# Patient Record
Sex: FEMALE | Race: BLACK OR AFRICAN AMERICAN | NOT HISPANIC OR LATINO | URBAN - METROPOLITAN AREA
[De-identification: names, ages, dates, MRNs, and addresses within clinical notes are randomized per-mention and may not be internally consistent; named-entity substitution may affect disease eponyms.]

---

## 2017-11-15 PROBLEM — Z00.00 ENCOUNTER FOR PREVENTIVE HEALTH EXAMINATION: Status: ACTIVE | Noted: 2017-11-15

## 2019-09-14 ENCOUNTER — EMERGENCY (EMERGENCY)
Facility: HOSPITAL | Age: 34
LOS: 1 days | Discharge: LEFT BEFORE DISCHARGE | End: 2019-09-14
Attending: EMERGENCY MEDICINE
Payer: COMMERCIAL

## 2019-09-14 VITALS
TEMPERATURE: 98 F | OXYGEN SATURATION: 100 % | RESPIRATION RATE: 18 BRPM | DIASTOLIC BLOOD PRESSURE: 97 MMHG | HEIGHT: 68 IN | WEIGHT: 214.95 LBS | HEART RATE: 121 BPM | SYSTOLIC BLOOD PRESSURE: 151 MMHG

## 2019-09-14 PROCEDURE — 99284 EMERGENCY DEPT VISIT MOD MDM: CPT | Mod: 25

## 2019-09-14 PROCEDURE — 93010 ELECTROCARDIOGRAM REPORT: CPT

## 2019-09-14 NOTE — ED ADULT NURSE NOTE - NSIMPLEMENTINTERV_GEN_ALL_ED
Implemented All Universal Safety Interventions:  [place] to call system. Call bell, personal items and telephone within reach. Instruct patient to call for assistance. Room bathroom lighting operational. Non-slip footwear when patient is off stretcher. Physically safe environment: no spills, clutter or unnecessary equipment. Stretcher in lowest position, wheels locked, appropriate side rails in place.

## 2019-09-14 NOTE — ED ADULT NURSE NOTE - OBJECTIVE STATEMENT
35 yo female c/o weakness, lightheadness/dizziness, nausea, and epsiodes of feeling cool and clammy. hx lupus/rheumatoid arthritis. pt denies vomiting, diarrhea. afebrile orally. pt tachycardic 120's, sinus tach, BP stable. pt denies chx pn /palpitations. pt denies SOB, l/s equal clr bilat apices to bases. abd soft nontender. pt A&OX4, PERRL. labs pending, MD bedside, will continue to monitor.

## 2019-09-15 VITALS
TEMPERATURE: 100 F | RESPIRATION RATE: 18 BRPM | SYSTOLIC BLOOD PRESSURE: 148 MMHG | HEART RATE: 114 BPM | OXYGEN SATURATION: 100 % | DIASTOLIC BLOOD PRESSURE: 92 MMHG

## 2019-09-15 LAB
ALBUMIN SERPL ELPH-MCNC: 3.5 G/DL — SIGNIFICANT CHANGE UP (ref 3.3–5)
ALP SERPL-CCNC: 81 U/L — SIGNIFICANT CHANGE UP (ref 40–120)
ALT FLD-CCNC: 11 U/L — SIGNIFICANT CHANGE UP (ref 10–45)
ANION GAP SERPL CALC-SCNC: 15 MMOL/L — SIGNIFICANT CHANGE UP (ref 5–17)
APPEARANCE UR: CLEAR — SIGNIFICANT CHANGE UP
AST SERPL-CCNC: 13 U/L — SIGNIFICANT CHANGE UP (ref 10–40)
BACTERIA # UR AUTO: NEGATIVE — SIGNIFICANT CHANGE UP
BASE EXCESS BLDV CALC-SCNC: 2.2 MMOL/L — HIGH (ref -2–2)
BASOPHILS # BLD AUTO: 0 K/UL — SIGNIFICANT CHANGE UP (ref 0–0.2)
BASOPHILS NFR BLD AUTO: 0.1 % — SIGNIFICANT CHANGE UP (ref 0–2)
BILIRUB SERPL-MCNC: 0.1 MG/DL — LOW (ref 0.2–1.2)
BILIRUB UR-MCNC: NEGATIVE — SIGNIFICANT CHANGE UP
BLD GP AB SCN SERPL QL: NEGATIVE — SIGNIFICANT CHANGE UP
BUN SERPL-MCNC: 14 MG/DL — SIGNIFICANT CHANGE UP (ref 7–23)
CA-I SERPL-SCNC: 1.18 MMOL/L — SIGNIFICANT CHANGE UP (ref 1.12–1.3)
CALCIUM SERPL-MCNC: 8.9 MG/DL — SIGNIFICANT CHANGE UP (ref 8.4–10.5)
CHLORIDE BLDV-SCNC: 104 MMOL/L — SIGNIFICANT CHANGE UP (ref 96–108)
CHLORIDE SERPL-SCNC: 103 MMOL/L — SIGNIFICANT CHANGE UP (ref 96–108)
CO2 BLDV-SCNC: 28 MMOL/L — SIGNIFICANT CHANGE UP (ref 22–30)
CO2 SERPL-SCNC: 22 MMOL/L — SIGNIFICANT CHANGE UP (ref 22–31)
COLOR SPEC: COLORLESS — SIGNIFICANT CHANGE UP
CREAT SERPL-MCNC: 0.43 MG/DL — LOW (ref 0.5–1.3)
DIFF PNL FLD: NEGATIVE — SIGNIFICANT CHANGE UP
EOSINOPHIL # BLD AUTO: 0 K/UL — SIGNIFICANT CHANGE UP (ref 0–0.5)
EOSINOPHIL NFR BLD AUTO: 0.2 % — SIGNIFICANT CHANGE UP (ref 0–6)
EPI CELLS # UR: 1 /HPF — SIGNIFICANT CHANGE UP
GAS PNL BLDV: 139 MMOL/L — SIGNIFICANT CHANGE UP (ref 135–145)
GAS PNL BLDV: SIGNIFICANT CHANGE UP
GAS PNL BLDV: SIGNIFICANT CHANGE UP
GLUCOSE BLDV-MCNC: 94 MG/DL — SIGNIFICANT CHANGE UP (ref 70–99)
GLUCOSE SERPL-MCNC: 94 MG/DL — SIGNIFICANT CHANGE UP (ref 70–99)
GLUCOSE UR QL: NEGATIVE — SIGNIFICANT CHANGE UP
HCO3 BLDV-SCNC: 26 MMOL/L — SIGNIFICANT CHANGE UP (ref 21–29)
HCT VFR BLD CALC: 27.4 % — LOW (ref 34.5–45)
HCT VFR BLDA CALC: 26 % — LOW (ref 39–50)
HGB BLD CALC-MCNC: 8.4 G/DL — LOW (ref 11.5–15.5)
HGB BLD-MCNC: 8.6 G/DL — LOW (ref 11.5–15.5)
HYALINE CASTS # UR AUTO: 0 /LPF — SIGNIFICANT CHANGE UP (ref 0–2)
KETONES UR-MCNC: NEGATIVE — SIGNIFICANT CHANGE UP
LACTATE BLDV-MCNC: 1.1 MMOL/L — SIGNIFICANT CHANGE UP (ref 0.7–2)
LEUKOCYTE ESTERASE UR-ACNC: NEGATIVE — SIGNIFICANT CHANGE UP
LYMPHOCYTES # BLD AUTO: 1.4 K/UL — SIGNIFICANT CHANGE UP (ref 1–3.3)
LYMPHOCYTES # BLD AUTO: 23.7 % — SIGNIFICANT CHANGE UP (ref 13–44)
MCHC RBC-ENTMCNC: 24.1 PG — LOW (ref 27–34)
MCHC RBC-ENTMCNC: 31.5 GM/DL — LOW (ref 32–36)
MCV RBC AUTO: 76.6 FL — LOW (ref 80–100)
MONOCYTES # BLD AUTO: 0.6 K/UL — SIGNIFICANT CHANGE UP (ref 0–0.9)
MONOCYTES NFR BLD AUTO: 10.9 % — SIGNIFICANT CHANGE UP (ref 2–14)
NEUTROPHILS # BLD AUTO: 3.7 K/UL — SIGNIFICANT CHANGE UP (ref 1.8–7.4)
NEUTROPHILS NFR BLD AUTO: 65.1 % — SIGNIFICANT CHANGE UP (ref 43–77)
NITRITE UR-MCNC: NEGATIVE — SIGNIFICANT CHANGE UP
OB PNL STL: NEGATIVE — SIGNIFICANT CHANGE UP
OTHER CELLS CSF MANUAL: 6 ML/DL — LOW (ref 18–22)
PCO2 BLDV: 42 MMHG — SIGNIFICANT CHANGE UP (ref 35–50)
PH BLDV: 7.42 — SIGNIFICANT CHANGE UP (ref 7.35–7.45)
PH UR: 6.5 — SIGNIFICANT CHANGE UP (ref 5–8)
PLATELET # BLD AUTO: 445 K/UL — HIGH (ref 150–400)
PO2 BLDV: 28 MMHG — SIGNIFICANT CHANGE UP (ref 25–45)
POTASSIUM BLDV-SCNC: 3.2 MMOL/L — LOW (ref 3.5–5.3)
POTASSIUM SERPL-MCNC: 3.5 MMOL/L — SIGNIFICANT CHANGE UP (ref 3.5–5.3)
POTASSIUM SERPL-SCNC: 3.5 MMOL/L — SIGNIFICANT CHANGE UP (ref 3.5–5.3)
PROT SERPL-MCNC: 7.6 G/DL — SIGNIFICANT CHANGE UP (ref 6–8.3)
PROT UR-MCNC: NEGATIVE — SIGNIFICANT CHANGE UP
RBC # BLD: 3.58 M/UL — LOW (ref 3.8–5.2)
RBC # FLD: 14.5 % — SIGNIFICANT CHANGE UP (ref 10.3–14.5)
RBC CASTS # UR COMP ASSIST: 1 /HPF — SIGNIFICANT CHANGE UP (ref 0–4)
RH IG SCN BLD-IMP: POSITIVE — SIGNIFICANT CHANGE UP
SAO2 % BLDV: 55 % — LOW (ref 67–88)
SODIUM SERPL-SCNC: 140 MMOL/L — SIGNIFICANT CHANGE UP (ref 135–145)
SP GR SPEC: 1.01 — LOW (ref 1.01–1.02)
UROBILINOGEN FLD QL: NEGATIVE — SIGNIFICANT CHANGE UP
WBC # BLD: 5.7 K/UL — SIGNIFICANT CHANGE UP (ref 3.8–10.5)
WBC # FLD AUTO: 5.7 K/UL — SIGNIFICANT CHANGE UP (ref 3.8–10.5)
WBC UR QL: 1 /HPF — SIGNIFICANT CHANGE UP (ref 0–5)

## 2019-09-15 PROCEDURE — 82435 ASSAY OF BLOOD CHLORIDE: CPT

## 2019-09-15 PROCEDURE — 93005 ELECTROCARDIOGRAM TRACING: CPT

## 2019-09-15 PROCEDURE — 80053 COMPREHEN METABOLIC PANEL: CPT

## 2019-09-15 PROCEDURE — 83605 ASSAY OF LACTIC ACID: CPT

## 2019-09-15 PROCEDURE — 84295 ASSAY OF SERUM SODIUM: CPT

## 2019-09-15 PROCEDURE — 85027 COMPLETE CBC AUTOMATED: CPT

## 2019-09-15 PROCEDURE — 96374 THER/PROPH/DIAG INJ IV PUSH: CPT | Mod: XU

## 2019-09-15 PROCEDURE — 99284 EMERGENCY DEPT VISIT MOD MDM: CPT | Mod: 25

## 2019-09-15 PROCEDURE — 82947 ASSAY GLUCOSE BLOOD QUANT: CPT

## 2019-09-15 PROCEDURE — 71275 CT ANGIOGRAPHY CHEST: CPT

## 2019-09-15 PROCEDURE — 81001 URINALYSIS AUTO W/SCOPE: CPT

## 2019-09-15 PROCEDURE — 85014 HEMATOCRIT: CPT

## 2019-09-15 PROCEDURE — 86850 RBC ANTIBODY SCREEN: CPT

## 2019-09-15 PROCEDURE — 86901 BLOOD TYPING SEROLOGIC RH(D): CPT

## 2019-09-15 PROCEDURE — 96361 HYDRATE IV INFUSION ADD-ON: CPT

## 2019-09-15 PROCEDURE — 82330 ASSAY OF CALCIUM: CPT

## 2019-09-15 PROCEDURE — 82272 OCCULT BLD FECES 1-3 TESTS: CPT

## 2019-09-15 PROCEDURE — 86900 BLOOD TYPING SEROLOGIC ABO: CPT

## 2019-09-15 PROCEDURE — 71045 X-RAY EXAM CHEST 1 VIEW: CPT

## 2019-09-15 PROCEDURE — 71275 CT ANGIOGRAPHY CHEST: CPT | Mod: 26

## 2019-09-15 PROCEDURE — 84132 ASSAY OF SERUM POTASSIUM: CPT

## 2019-09-15 PROCEDURE — 82803 BLOOD GASES ANY COMBINATION: CPT

## 2019-09-15 PROCEDURE — 71045 X-RAY EXAM CHEST 1 VIEW: CPT | Mod: 26

## 2019-09-15 RX ORDER — ACETAMINOPHEN 500 MG
975 TABLET ORAL ONCE
Refills: 0 | Status: COMPLETED | OUTPATIENT
Start: 2019-09-15 | End: 2019-09-15

## 2019-09-15 RX ORDER — MORPHINE SULFATE 50 MG/1
4 CAPSULE, EXTENDED RELEASE ORAL ONCE
Refills: 0 | Status: DISCONTINUED | OUTPATIENT
Start: 2019-09-15 | End: 2019-09-15

## 2019-09-15 RX ORDER — METOCLOPRAMIDE HCL 10 MG
10 TABLET ORAL ONCE
Refills: 0 | Status: COMPLETED | OUTPATIENT
Start: 2019-09-15 | End: 2019-09-15

## 2019-09-15 RX ORDER — SODIUM CHLORIDE 9 MG/ML
1000 INJECTION, SOLUTION INTRAVENOUS ONCE
Refills: 0 | Status: COMPLETED | OUTPATIENT
Start: 2019-09-15 | End: 2019-09-15

## 2019-09-15 RX ADMIN — Medication 975 MILLIGRAM(S): at 01:01

## 2019-09-15 RX ADMIN — Medication 10 MILLIGRAM(S): at 01:01

## 2019-09-15 RX ADMIN — SODIUM CHLORIDE 1000 MILLILITER(S): 9 INJECTION, SOLUTION INTRAVENOUS at 03:49

## 2019-09-15 RX ADMIN — Medication 975 MILLIGRAM(S): at 03:48

## 2019-09-15 RX ADMIN — SODIUM CHLORIDE 1000 MILLILITER(S): 9 INJECTION, SOLUTION INTRAVENOUS at 01:02

## 2019-09-15 NOTE — ED PROVIDER NOTE - NSFOLLOWUPINSTRUCTIONS_ED_ALL_ED_FT
You were seen in the ER for joint pain and lightheadedness.    1. Follow up with your primary care doctor within 48 hours.    2. Continue to take your home medications as prescribed by your primary care doctor.    3. Return to the ER for any new or worsening symptoms or concerns, such as fever, chills, worsening pain, lightheadedness, etc.

## 2019-09-15 NOTE — ED PROVIDER NOTE - CLINICAL SUMMARY MEDICAL DECISION MAKING FREE TEXT BOX
see attending attestation see attending attestation    Armando, PGY1 - 34F PMH SLE, RA on Rituxan and Plaquenil p/w lightheadedness, nausea, dry cough, SOB, chills x 9 days s/p Rituxan infusion on 9/3/19. Tachycardic in the ED. BL knee swelling. DDx includes SLE/RA flare up, infection. R/o PE. Will obtain labs, CXR, EKG. Symptomatically control, reassess.

## 2019-09-15 NOTE — ED PROVIDER NOTE - PROGRESS NOTE DETAILS
Armando, PGY1 - Pt endorses great improvement in lightheadedness, nausea, and elbow pain. Still endorsing mild knee pain. Armando, PGY1 - Spoke to radiology Dr. Reagan. Unable to obtain adequte CTA study 2/2 pt unable to tolerate hand IV. Pt persistently tachycadic. Will attempt USIV and repeat CTA. Armando, PGY1 - Spoke to radiology Dr. Reagan. Unable to obtain adequte CTA study 2/2 pt unable to tolerate hand IV. Pt persistently tachycardic. Will attempt USIV and repeat CTA. Armando, PGY1 - Discussed need for USIV and repeat CTA with pt. Pt declines USIV and CTA, and expresses that she would like to be discharged home against medical advice. Reports great improvement in symptoms and feels comfortable following up with her PMD tomorrow. I have assessed the patient's mental status and  the patient has capacity to make this decision. I have explained the risks of leaving without full treatment, including severe disability and death, which the patient understands and is willing to accept. I have answered all of the patient's questions. I reiterated my medical opinion and advised the patient to return at any time. We discussed the further workup outside of the current visit and return precautions.

## 2019-09-15 NOTE — ED ADULT NURSE REASSESSMENT NOTE - NS ED NURSE REASSESS COMMENT FT1
CTA unsuccessful, pt requesting to speak to MD about leaving AMA. Pt st medication resolved symptoms of nausea/dizziness. currently denies all complaints. pt still tachycardic.

## 2019-09-15 NOTE — ED PROVIDER NOTE - OBJECTIVE STATEMENT
34F PMH SLE, RA on Rituxan and Plaquenil p/w lightheadedness, nausea, dry cough, SOB, chills x 9 days s/p Rituxan infusion on 9/3/19. No change in her baseline CP. Pt receives Rituxan q6 months and has never had a reaction before. Presents to ED after noticing hard, black stool today. No fevers, sick contacts. 34F PMH SLE, RA on Rituxan and Plaquenil p/w lightheadedness, nausea, dry cough, SOB, chills, worsening joint swelling and pain x 9 days s/p Rituxan infusion on 9/3/19. No change in her baseline CP. Pt receives Rituxan q6 months and has never had a reaction before. Presents to ED after noticing hard, black stool today. No fevers, sick contacts.

## 2019-09-15 NOTE — ED PROVIDER NOTE - PATIENT PORTAL LINK FT
You can access the FollowMyHealth Patient Portal offered by [facility] by registering at the following website: [email or web address]. By joining [email or web address]’s FollowMyHealth portal, you will also be able to view your health information using other applications (apps) compatible with our system.

## 2019-09-15 NOTE — ED PROVIDER NOTE - NS ED ROS FT
General: +Chills, lightheadedness. Denies fever  Eyes: Denies vision changes  ENMT: Denies trouble swallowing or speaking, or sore throat  CV: +chest pain  Resp: +cough or SOB  GI: +nausea, black stool. Denies abdominal pain, vomiting, diarrhea, or constipation   : Denies painful urination  Skin: Denies new rashes  Neuro: Denies headache, numbness, or focal weakness  MSK: +chronic joint pain, +BL knee swelling. Denies recent trauma General: +Chills, lightheadedness. Denies fever  Eyes: Denies vision changes  ENMT: Denies trouble swallowing or speaking, or sore throat  CV: +Chest pain  Resp: +Cough, SOB  GI: +Nausea, black stool. Denies abdominal pain, vomiting, diarrhea, or constipation   : Denies painful urination  Skin: Denies new rashes  Neuro: Denies headache, numbness, or focal weakness  MSK: +Chronic joint pain, +BL knee swelling and pain. Denies recent trauma

## 2019-09-15 NOTE — ED PROVIDER NOTE - PHYSICAL EXAMINATION
I have reviewed the triage vital signs.  Const: AAOx3, laying supine in NAD  Eyes: PERRL, no conjunctival injection  HENT: NCAT, Neck supple without meningismus, oral mucosa moist  CV: RRR, no murmurs, rubs, or gallops, 2+ radial and DP pulses  Resp: CTAB, no respiratory distress, no wheezes, rales, or rhonchi  GI: Abdomen soft, NTND, no guarding, no CVA tenderness  Skin: Warm, well perfused, no rash  MSK: +BL knee swelling. TTP upper back (baseline). No gross deformities appreciated  Neuro: No focal sensory or motor deficits, CN 2-12 grossly intact  Psych: Appropriate mood and affect I have reviewed the triage vital signs.  Const: AAOx3, laying supine in NAD  Eyes: PERRL, no conjunctival injection  HENT: NCAT, Neck supple without meningismus, oral mucosa moist  CV: Tachycardic, regular rhythm, no murmurs, rubs, or gallops, 2+ radial and DP pulses  Resp: CTAB, no respiratory distress, no wheezes, rales, or rhonchi  GI: Abdomen soft, NTND, no guarding, no CVA tenderness  Skin: Warm, well perfused, no rash  MSK: +BL elbow and knee swelling. TTP upper back (baseline). No midline tenderness. No gross deformities appreciated  Neuro: No focal sensory or motor deficits, CN 2-12 grossly intact  Psych: Appropriate mood and affect, anxious

## 2019-09-15 NOTE — ED PROVIDER NOTE - CARE PLAN
Principal Discharge DX:	Joint pain  Secondary Diagnosis:	Tachycardia  Secondary Diagnosis:	Lightheadedness Principal Discharge DX:	Rheumatoid arthritis flare  Secondary Diagnosis:	Tachycardia  Secondary Diagnosis:	Lightheadedness  Secondary Diagnosis:	Joint pain  Secondary Diagnosis:	Lupus

## 2019-09-15 NOTE — ED PROVIDER NOTE - ATTENDING CONTRIBUTION TO CARE
33yo f hx Lupus, Rheumatoid arthritis on rituxin and plaquenil pw lightheadedness, nausea and chills since her last rituxin treatment on the 3rd. Patient has had rituxin before wo any reaction. Patient today had a hard stool that was dark in appearance abd became concerned. Pt also has a flare at this time w b/l knee swelling that is also new for her. No fever, recent travel.   Pt noted to be tachycardic. EKG reviewed.   On exam, Patient is awake,alert,oriented x 3. Patient is well appearing and in no acute distress. Patient's chest is clear to ausculation, +s1s2. Abdomen is soft nd/nt +BS. Extremity with no swelling or calf tenderness. B/L knee w swelling, Full ROM, nml sensation.  Rectal per resident.  Check Labs, EKG. Pt states she is in pain. DDx Lupus flare, ro PE, Treat with pain meds and re eval.

## 2022-12-12 PROBLEM — Z00.00 ENCOUNTER FOR PREVENTIVE HEALTH EXAMINATION: Status: ACTIVE | Noted: 2022-12-12

## 2022-12-22 ENCOUNTER — APPOINTMENT (OUTPATIENT)
Dept: ORTHOPEDIC SURGERY | Facility: CLINIC | Age: 37
End: 2022-12-22

## 2022-12-22 VITALS — HEIGHT: 67 IN | BODY MASS INDEX: 34.53 KG/M2 | WEIGHT: 220 LBS

## 2022-12-22 DIAGNOSIS — Z78.9 OTHER SPECIFIED HEALTH STATUS: ICD-10-CM

## 2022-12-22 DIAGNOSIS — M17.12 UNILATERAL PRIMARY OSTEOARTHRITIS, LEFT KNEE: ICD-10-CM

## 2022-12-22 PROCEDURE — 73564 X-RAY EXAM KNEE 4 OR MORE: CPT | Mod: LT

## 2022-12-22 PROCEDURE — 99204 OFFICE O/P NEW MOD 45 MIN: CPT

## 2022-12-22 NOTE — ASSESSMENT
[FreeTextEntry1] : 36 y/o F with severe L knee OA\par \par \par Osiel ROSE M.D. discussed the patient’s diagnosis and treatment options, non-surgical and surgical, with the patient and/or legal guardian including the potential benefits and complications of each. Potential complications of non-surgical treatments discussed include residual pain and/or disability, non-healing, progression of symptoms and/or disease. Potential complications of surgery discussed include infection, nerve injury, vascular injury, cartilage injury, ligament injury, tendon injury, muscle injury, skin injury, stiffness, instability, weakness, persistent pain, technical or hardware failure, need for additional surgery, re-injury, medical complication, anesthetic related complication, and/or death. All questions were answered. The patient and/or legal guardian has elected to proceed with surgery. Informed consent obtained for Left CAT TKA\par \par                   \par Preoperative evaluation and testing as needed is advised within 30 days prior to the procedure.\par

## 2022-12-22 NOTE — IMAGING
[de-identified] : Constitutional: well developed and well nourished, able to communicate\par Cardiovascular: Peripheral vascular exam is grossly normal\par Neurologic: Alert and oriented, no acute distress.\par Skin: normal skin with no ulcers, rashes, or lesions\par Pulmonary: No respiratory distress, breathing comfortably on room air\par Lymphatics: No obvious lymphadenopathy or lymphedema in areas examined\par \par Knee Exam\par Alignment: Valgus \par Effusion: None\par Atrophy: None                                                \par Stable to Varus/valgus stress\par Posterior Drawer Test: negative\par Anterior Drawer Test: Negative\par Knee Extension/Flexion: 0 / 120\par \par Medial/lateral compartments\par Medial joint line: POS Tenderness\par Lateral joint line: POS Tenderness\par Jennifer test: negative\par \par Patellofemoral joint\par Medial patellar facet: no tenderness\par Patellar grind: Negative\par \par Tendons:\par Pes Anserine: No tenderness\par Gerdy’s Tubercle/ IT Band: No tenderness\par Quadriceps Tendon: No Tenderness\par patellar tendon: no Tenderness\par Tibial tubercle: not tenderness\par Calf: no Tenderness\par \par Neurovascular exam\par Muscle strength: 5/5\par Sensation to light touch: intact\par Distal pulses: 2+\par \par IMAGING:\par 12/22/22: 4v of L knee showing valgus OA, bone on bone arthritis, lateral joint collapse, sclerosis and osteophytes noted

## 2022-12-22 NOTE — HISTORY OF PRESENT ILLNESS
[3] : 3 [0] : 0 [Sharp] : sharp [Throbbing] : throbbing [Intermittent] : intermittent [Household chores] : household chores [Leisure] : leisure [Work] : work [Nothing helps with pain getting better] : Nothing helps with pain getting better [de-identified] : 12/22/22: 36 y/o F with PMHx of RA. Has known OA to L knee. Has had CSI and gel injections. States the last gel was in June, no relieve but was done with rheumatology. States the first injection had great relieve. States CSI no longer giving her relieve. \par \par PMH: RA (no biologic agents), no hx of blood clots, no bloodthinners [] : Post Surgical Visit: no [FreeTextEntry1] : left knee [FreeTextEntry5] : pt feels pain and pressure in the knee [FreeTextEntry7] : left hip [de-identified] : motion/pressure

## 2022-12-27 ENCOUNTER — FORM ENCOUNTER (OUTPATIENT)
Age: 37
End: 2022-12-27

## 2022-12-28 ENCOUNTER — APPOINTMENT (OUTPATIENT)
Dept: CT IMAGING | Facility: CLINIC | Age: 37
End: 2022-12-28

## 2022-12-28 PROCEDURE — 76376 3D RENDER W/INTRP POSTPROCES: CPT | Mod: LT

## 2022-12-28 PROCEDURE — 73700 CT LOWER EXTREMITY W/O DYE: CPT | Mod: LT

## 2023-01-10 ENCOUNTER — OUTPATIENT (OUTPATIENT)
Dept: OUTPATIENT SERVICES | Facility: HOSPITAL | Age: 38
LOS: 1 days | Discharge: HOME | End: 2023-01-10
Payer: COMMERCIAL

## 2023-01-10 VITALS
HEIGHT: 68 IN | RESPIRATION RATE: 18 BRPM | OXYGEN SATURATION: 99 % | SYSTOLIC BLOOD PRESSURE: 149 MMHG | TEMPERATURE: 98 F | HEART RATE: 100 BPM | WEIGHT: 206.79 LBS | DIASTOLIC BLOOD PRESSURE: 90 MMHG

## 2023-01-10 DIAGNOSIS — Z01.812 ENCOUNTER FOR PREPROCEDURAL LABORATORY EXAMINATION: ICD-10-CM

## 2023-01-10 DIAGNOSIS — M17.12 UNILATERAL PRIMARY OSTEOARTHRITIS, LEFT KNEE: ICD-10-CM

## 2023-01-10 DIAGNOSIS — M06.9 RHEUMATOID ARTHRITIS, UNSPECIFIED: ICD-10-CM

## 2023-01-10 DIAGNOSIS — Z01.818 ENCOUNTER FOR OTHER PREPROCEDURAL EXAMINATION: ICD-10-CM

## 2023-01-10 LAB
ALBUMIN SERPL ELPH-MCNC: 3.4 G/DL — SIGNIFICANT CHANGE UP (ref 3.3–5)
ALP SERPL-CCNC: 80 U/L — SIGNIFICANT CHANGE UP (ref 40–120)
ALT FLD-CCNC: 10 U/L — LOW (ref 12–78)
ANION GAP SERPL CALC-SCNC: 6 MMOL/L — SIGNIFICANT CHANGE UP (ref 5–17)
APTT BLD: 32.5 SEC — SIGNIFICANT CHANGE UP (ref 27.5–35.5)
AST SERPL-CCNC: 15 U/L — SIGNIFICANT CHANGE UP (ref 15–37)
BASOPHILS # BLD AUTO: 0.01 K/UL — SIGNIFICANT CHANGE UP (ref 0–0.2)
BASOPHILS NFR BLD AUTO: 0.2 % — SIGNIFICANT CHANGE UP (ref 0–2)
BILIRUB SERPL-MCNC: 0.3 MG/DL — SIGNIFICANT CHANGE UP (ref 0.2–1.2)
BLD GP AB SCN SERPL QL: SIGNIFICANT CHANGE UP
BUN SERPL-MCNC: 15 MG/DL — SIGNIFICANT CHANGE UP (ref 7–23)
CALCIUM SERPL-MCNC: 9.1 MG/DL — SIGNIFICANT CHANGE UP (ref 8.5–10.1)
CHLORIDE SERPL-SCNC: 107 MMOL/L — SIGNIFICANT CHANGE UP (ref 96–108)
CO2 SERPL-SCNC: 27 MMOL/L — SIGNIFICANT CHANGE UP (ref 22–31)
CREAT SERPL-MCNC: 0.52 MG/DL — SIGNIFICANT CHANGE UP (ref 0.5–1.3)
EGFR: 123 ML/MIN/1.73M2 — SIGNIFICANT CHANGE UP
EOSINOPHIL # BLD AUTO: 0.01 K/UL — SIGNIFICANT CHANGE UP (ref 0–0.5)
EOSINOPHIL NFR BLD AUTO: 0.2 % — SIGNIFICANT CHANGE UP (ref 0–6)
GLUCOSE SERPL-MCNC: 85 MG/DL — SIGNIFICANT CHANGE UP (ref 70–99)
HCG SERPL-ACNC: <1 MIU/ML — SIGNIFICANT CHANGE UP
HCT VFR BLD CALC: 38.3 % — SIGNIFICANT CHANGE UP (ref 34.5–45)
HGB BLD-MCNC: 11.5 G/DL — SIGNIFICANT CHANGE UP (ref 11.5–15.5)
IMM GRANULOCYTES NFR BLD AUTO: 0.2 % — SIGNIFICANT CHANGE UP (ref 0–0.9)
INR BLD: 1.21 RATIO — HIGH (ref 0.88–1.16)
LYMPHOCYTES # BLD AUTO: 1.17 K/UL — SIGNIFICANT CHANGE UP (ref 1–3.3)
LYMPHOCYTES # BLD AUTO: 22.6 % — SIGNIFICANT CHANGE UP (ref 13–44)
MCHC RBC-ENTMCNC: 24.8 PG — LOW (ref 27–34)
MCHC RBC-ENTMCNC: 30 G/DL — LOW (ref 32–36)
MCV RBC AUTO: 82.7 FL — SIGNIFICANT CHANGE UP (ref 80–100)
MONOCYTES # BLD AUTO: 0.62 K/UL — SIGNIFICANT CHANGE UP (ref 0–0.9)
MONOCYTES NFR BLD AUTO: 12 % — SIGNIFICANT CHANGE UP (ref 2–14)
NEUTROPHILS # BLD AUTO: 3.35 K/UL — SIGNIFICANT CHANGE UP (ref 1.8–7.4)
NEUTROPHILS NFR BLD AUTO: 64.8 % — SIGNIFICANT CHANGE UP (ref 43–77)
NRBC # BLD: 0 /100 WBCS — SIGNIFICANT CHANGE UP (ref 0–0)
PLATELET # BLD AUTO: 290 K/UL — SIGNIFICANT CHANGE UP (ref 150–400)
POTASSIUM SERPL-MCNC: 3.4 MMOL/L — LOW (ref 3.5–5.3)
POTASSIUM SERPL-SCNC: 3.4 MMOL/L — LOW (ref 3.5–5.3)
PROT SERPL-MCNC: 7.7 GM/DL — SIGNIFICANT CHANGE UP (ref 6–8.3)
PROTHROM AB SERPL-ACNC: 14.4 SEC — HIGH (ref 10.5–13.4)
RBC # BLD: 4.63 M/UL — SIGNIFICANT CHANGE UP (ref 3.8–5.2)
RBC # FLD: 16.1 % — HIGH (ref 10.3–14.5)
SODIUM SERPL-SCNC: 140 MMOL/L — SIGNIFICANT CHANGE UP (ref 135–145)
WBC # BLD: 5.17 K/UL — SIGNIFICANT CHANGE UP (ref 3.8–10.5)
WBC # FLD AUTO: 5.17 K/UL — SIGNIFICANT CHANGE UP (ref 3.8–10.5)

## 2023-01-10 PROCEDURE — 93010 ELECTROCARDIOGRAM REPORT: CPT

## 2023-01-10 NOTE — OCCUPATIONAL THERAPY INITIAL EVALUATION ADULT - PERTINENT HX OF CURRENT PROBLEM, REHAB EVAL
Pt is a 36 y/o female slated for elective surgery for left TKR with MD Hogue, due to chronic RA, with severe pain.

## 2023-01-10 NOTE — OCCUPATIONAL THERAPY INITIAL EVALUATION ADULT - RANGE OF MOTION EXAMINATION, LOWER EXTREMITY
ROM is limited in left knee due to pain/Left LE Passive ROM was WFL (w/i functional limits)/Left LE Active Assistive ROM was WFL (within functional limits)/Right LE Active ROM was WNL(within normal limits)/Right LE Passive ROM was WNL (within normal limits)

## 2023-01-10 NOTE — OCCUPATIONAL THERAPY INITIAL EVALUATION ADULT - GENERAL OBSERVATIONS, REHAB EVAL
Chart reviewed. Patient encountered seated in chair in rehab preop room in [facility]. Patient underwent occupational therapy pre-operative consultation to determine current functional ADL limitations in order to provide the right equipment for patient to perform functional ADL post operation.

## 2023-01-10 NOTE — H&P PST ADULT - ATTENDING COMMENTS
I discussed this dx with the family at length. In order to restore the patient's ability to ambulate with minimal pain, a left TKA was recommended. The risks of the procedure were discussed at length which included but not limited to infection, bleeding, and damage to neurovascular structures. Informed consent was obtained.

## 2023-01-10 NOTE — H&P PST ADULT - ASSESSMENT
37F pmhx RA c/o left knee pain 2/2 unilateral primary osteoarthritis here for PST for scheduled Robotic assisted left total knee arthroplasty  CAPRINI SCORE [CLOT]    AGE RELATED RISK FACTORS                                                       MOBILITY RELATED FACTORS  [ ] Age 41-60 years                                            (1 Point)                  [ ] Bed rest                                                        (1 Point)  [ ] Age: 61-74 years                                           (2 Points)                 [ ] Plaster cast                                                   (2 Points)  [ ] Age= 75 years                                              (3 Points)                 [ ] Bed bound for more than 72 hours                 (2 Points)    DISEASE RELATED RISK FACTORS                                               GENDER SPECIFIC FACTORS  [ ] Edema in the lower extremities                       (1 Point)                  [ ] Pregnancy                                                     (1 Point)  [ ] Varicose veins                                               (1 Point)                  [ ] Post-partum < 6 weeks                                   (1 Point)             [x ] BMI > 25 Kg/m2                                            (1 Point)                  [ ] Hormonal therapy  or oral contraception          (1 Point)                 [ ] Sepsis (in the previous month)                        (1 Point)                  [ ] History of pregnancy complications                 (1 point)  [ ] Pneumonia or serious lung disease                                               [ ] Unexplained or recurrent                     (1 Point)           (in the previous month)                               (1 Point)  [ ] Abnormal pulmonary function test                     (1 Point)                 SURGERY RELATED RISK FACTORS  [ ] Acute myocardial infarction                              (1 Point)                 [ ]  Section                                             (1 Point)  [ ] Congestive heart failure (in the previous month)  (1 Point)               [ ] Minor surgery                                                  (1 Point)   [ ] Inflammatory bowel disease                             (1 Point)                 [ ] Arthroscopic surgery                                        (2 Points)  [ ] Central venous access                                      (2 Points)                [ ] General surgery lasting more than 45 minutes   (2 Points)       [ ] Stroke (in the previous month)                          (5 Points)               [ x] Elective arthroplasty                                         (5 Points)                                                                                                                                               HEMATOLOGY RELATED FACTORS                                                 TRAUMA RELATED RISK FACTORS  [ ] Prior episodes of VTE                                     (3 Points)                [ ] Fracture of the hip, pelvis, or leg                       (5 Points)  [ ] Positive family history for VTE                         (3 Points)                 [ ] Acute spinal cord injury (in the previous month)  (5 Points)  [ ] Prothrombin 21926 A                                     (3 Points)                 [ ] Paralysis  (less than 1 month)                             (5 Points)  [ ] Factor V Leiden                                             (3 Points)                  [ ] Multiple Trauma within 1 month                        (5 Points)  [ ] Lupus anticoagulants                                     (3 Points)                                                           [ ] Anticardiolipin antibodies                               (3 Points)                                                       [ ] High homocysteine in the blood                      (3 Points)                                             [ ] Other congenital or acquired thrombophilia      (3 Points)                                                [ ] Heparin induced thrombocytopenia                  (3 Points)                                          Total Score [      6    ]    Caprini Score 0 - 2:  Low Risk, No VTE Prophylaxis required for most patients, encourage ambulation  Caprini Score 3 - 6:  At Risk, pharmacologic VTE prophylaxis is indicated for most patients (in the absence of a contraindication)  Caprini Score Greater than or = 7:  High Risk, pharmacologic VTE prophylaxis is indicated for most patients (in the absence of a contraindication)

## 2023-01-10 NOTE — H&P PST ADULT - PROBLEM SELECTOR PLAN 1
robotic assisted left total knee arthroplasty  labs - cbc,pt/ptt,bmp,t&s,nose cx,ekg  M/C required  rheumatology clearance  preop 3 day hibiclens instruction reviewed and given .instructed on if  nose cx positive use mupuricin 5 days and checklist given  take routine meds DOS with sips of water. avoid NSAID and OTC supplements. verbalized understanding  information on proper nutrition , increase protein and better food choices provided in packet   ensure clear given  Anesthesiologist to review PST labs, EKG, required clearances and optimization for surgery.

## 2023-01-10 NOTE — H&P PST ADULT - HISTORY OF PRESENT ILLNESS
37F pmhx RA c/o left knee pain 2/2 unilateral primary osteoarthritis here for PST for scheduled Robotic assisted left total knee arthroplasty  This patient denies any fever, cough, sob, flu like symptoms or travel outside of the [place] in the past 30 days

## 2023-01-10 NOTE — OCCUPATIONAL THERAPY INITIAL EVALUATION ADULT - RANGE OF MOTION EXAMINATION, UPPER EXTREMITY
Pt has extension lag in both elbows./bilateral UE Active ROM was WFL  (within functional limits)/bilateral UE Passive ROM was WFL  (within functional limits)

## 2023-01-10 NOTE — OCCUPATIONAL THERAPY INITIAL EVALUATION ADULT - NSOTDISCHREC_GEN_A_CORE
postoperatively to remediate deficit areas in order to achieve functional independent with ADL management and functional mobility/Home OT

## 2023-01-10 NOTE — OCCUPATIONAL THERAPY INITIAL EVALUATION ADULT - ADDITIONAL COMMENTS
At this time, pt is functioning in her roles, self sufficient, driving & ambulating independently in the community without any assistive devices. Pt c/o 10/10 pain in her left knee. The pain is exacerbated, by walking, prolonged standing, negotiating steps and is relieved with Aleve. Pt is right hand dominant and does not wears glasses.

## 2023-01-10 NOTE — OCCUPATIONAL THERAPY INITIAL EVALUATION ADULT - LIVES WITH, PROFILE
Pt lives alone in an apartment with no steps to enter. The building is equipped with elevator access. All living amenities are located on one level. The bathroom has a tub/shower combination  fixed/ retractable shower head and standard toilet with adequate space to fit a commode over it.

## 2023-01-11 LAB
A1C WITH ESTIMATED AVERAGE GLUCOSE RESULT: 5.3 % — SIGNIFICANT CHANGE UP (ref 4–5.6)
ESTIMATED AVERAGE GLUCOSE: 105 MG/DL — SIGNIFICANT CHANGE UP (ref 68–114)
MRSA PCR RESULT.: SIGNIFICANT CHANGE UP
S AUREUS DNA NOSE QL NAA+PROBE: SIGNIFICANT CHANGE UP
VIT D25+D1,25 OH+D1,25 PNL SERPL-MCNC: 57.7 PG/ML — SIGNIFICANT CHANGE UP (ref 19.9–79.3)

## 2023-01-23 RX ORDER — DEXAMETHASONE 0.5 MG/5ML
10 ELIXIR ORAL ONCE
Refills: 0 | Status: COMPLETED | OUTPATIENT
Start: 2023-01-25 | End: 2023-01-25

## 2023-01-23 RX ORDER — GABAPENTIN 400 MG/1
300 CAPSULE ORAL EVERY 12 HOURS
Refills: 0 | Status: DISCONTINUED | OUTPATIENT
Start: 2023-01-24 | End: 2023-01-25

## 2023-01-23 RX ORDER — ASPIRIN/CALCIUM CARB/MAGNESIUM 324 MG
81 TABLET ORAL
Refills: 0 | Status: DISCONTINUED | OUTPATIENT
Start: 2023-01-25 | End: 2023-01-25

## 2023-01-23 RX ORDER — KETOROLAC TROMETHAMINE 30 MG/ML
30 SYRINGE (ML) INJECTION EVERY 8 HOURS
Refills: 0 | Status: DISCONTINUED | OUTPATIENT
Start: 2023-01-24 | End: 2023-01-25

## 2023-01-23 RX ORDER — SODIUM CHLORIDE 9 MG/ML
1000 INJECTION, SOLUTION INTRAVENOUS
Refills: 0 | Status: DISCONTINUED | OUTPATIENT
Start: 2023-01-24 | End: 2023-01-25

## 2023-01-23 RX ORDER — ASCORBIC ACID 60 MG
500 TABLET,CHEWABLE ORAL
Refills: 0 | Status: DISCONTINUED | OUTPATIENT
Start: 2023-01-24 | End: 2023-01-25

## 2023-01-23 RX ORDER — PANTOPRAZOLE SODIUM 20 MG/1
40 TABLET, DELAYED RELEASE ORAL
Refills: 0 | Status: DISCONTINUED | OUTPATIENT
Start: 2023-01-24 | End: 2023-01-25

## 2023-01-23 RX ORDER — CELECOXIB 200 MG/1
200 CAPSULE ORAL EVERY 12 HOURS
Refills: 0 | Status: DISCONTINUED | OUTPATIENT
Start: 2023-01-25 | End: 2023-01-25

## 2023-01-23 RX ORDER — SENNA PLUS 8.6 MG/1
2 TABLET ORAL AT BEDTIME
Refills: 0 | Status: DISCONTINUED | OUTPATIENT
Start: 2023-01-24 | End: 2023-01-25

## 2023-01-23 RX ORDER — SODIUM CHLORIDE 9 MG/ML
3 INJECTION INTRAMUSCULAR; INTRAVENOUS; SUBCUTANEOUS EVERY 8 HOURS
Refills: 0 | Status: DISCONTINUED | OUTPATIENT
Start: 2023-01-24 | End: 2023-01-24

## 2023-01-23 RX ORDER — POLYETHYLENE GLYCOL 3350 17 G/17G
17 POWDER, FOR SOLUTION ORAL AT BEDTIME
Refills: 0 | Status: DISCONTINUED | OUTPATIENT
Start: 2023-01-24 | End: 2023-01-25

## 2023-01-23 RX ORDER — ACETAMINOPHEN 500 MG
650 TABLET ORAL ONCE
Refills: 0 | Status: COMPLETED | OUTPATIENT
Start: 2023-01-24 | End: 2023-01-24

## 2023-01-23 RX ORDER — CYCLOBENZAPRINE HYDROCHLORIDE 10 MG/1
10 TABLET, FILM COATED ORAL THREE TIMES A DAY
Refills: 0 | Status: DISCONTINUED | OUTPATIENT
Start: 2023-01-24 | End: 2023-01-25

## 2023-01-23 RX ORDER — ACETAMINOPHEN 500 MG
1000 TABLET ORAL ONCE
Refills: 0 | Status: COMPLETED | OUTPATIENT
Start: 2023-01-24 | End: 2023-01-25

## 2023-01-23 RX ORDER — OXYCODONE HYDROCHLORIDE 5 MG/1
5 TABLET ORAL EVERY 4 HOURS
Refills: 0 | Status: DISCONTINUED | OUTPATIENT
Start: 2023-01-24 | End: 2023-01-25

## 2023-01-23 RX ORDER — HYDROMORPHONE HYDROCHLORIDE 2 MG/ML
0.5 INJECTION INTRAMUSCULAR; INTRAVENOUS; SUBCUTANEOUS ONCE
Refills: 0 | Status: DISCONTINUED | OUTPATIENT
Start: 2023-01-24 | End: 2023-01-25

## 2023-01-23 RX ORDER — ONDANSETRON 8 MG/1
4 TABLET, FILM COATED ORAL EVERY 6 HOURS
Refills: 0 | Status: DISCONTINUED | OUTPATIENT
Start: 2023-01-24 | End: 2023-01-25

## 2023-01-23 RX ORDER — OXYCODONE HYDROCHLORIDE 5 MG/1
10 TABLET ORAL EVERY 4 HOURS
Refills: 0 | Status: DISCONTINUED | OUTPATIENT
Start: 2023-01-24 | End: 2023-01-25

## 2023-01-24 ENCOUNTER — TRANSCRIPTION ENCOUNTER (OUTPATIENT)
Age: 38
End: 2023-01-24

## 2023-01-24 ENCOUNTER — RESULT REVIEW (OUTPATIENT)
Age: 38
End: 2023-01-24

## 2023-01-24 ENCOUNTER — APPOINTMENT (OUTPATIENT)
Dept: ORTHOPEDIC SURGERY | Facility: HOSPITAL | Age: 38
End: 2023-01-24
Payer: COMMERCIAL

## 2023-01-24 ENCOUNTER — INPATIENT (INPATIENT)
Facility: HOSPITAL | Age: 38
LOS: 0 days | Discharge: HOME | End: 2023-01-25
Attending: ORTHOPAEDIC SURGERY | Admitting: ORTHOPAEDIC SURGERY
Payer: COMMERCIAL

## 2023-01-24 VITALS
OXYGEN SATURATION: 97 % | DIASTOLIC BLOOD PRESSURE: 91 MMHG | TEMPERATURE: 99 F | HEART RATE: 88 BPM | HEIGHT: 67 IN | RESPIRATION RATE: 17 BRPM | WEIGHT: 214.95 LBS | SYSTOLIC BLOOD PRESSURE: 144 MMHG

## 2023-01-24 LAB
ANION GAP SERPL CALC-SCNC: 5 MMOL/L — SIGNIFICANT CHANGE UP (ref 5–17)
APTT BLD: 31.9 SEC — SIGNIFICANT CHANGE UP (ref 27.5–35.5)
BUN SERPL-MCNC: 9 MG/DL — SIGNIFICANT CHANGE UP (ref 7–23)
CALCIUM SERPL-MCNC: 8.5 MG/DL — SIGNIFICANT CHANGE UP (ref 8.5–10.1)
CHLORIDE SERPL-SCNC: 113 MMOL/L — HIGH (ref 96–108)
CO2 SERPL-SCNC: 24 MMOL/L — SIGNIFICANT CHANGE UP (ref 22–31)
CREAT SERPL-MCNC: 0.51 MG/DL — SIGNIFICANT CHANGE UP (ref 0.5–1.3)
EGFR: 123 ML/MIN/1.73M2 — SIGNIFICANT CHANGE UP
GLUCOSE SERPL-MCNC: 96 MG/DL — SIGNIFICANT CHANGE UP (ref 70–99)
HCT VFR BLD CALC: 33.7 % — LOW (ref 34.5–45)
HGB BLD-MCNC: 10 G/DL — LOW (ref 11.5–15.5)
INR BLD: 1.15 RATIO — SIGNIFICANT CHANGE UP (ref 0.88–1.16)
MCHC RBC-ENTMCNC: 24.4 PG — LOW (ref 27–34)
MCHC RBC-ENTMCNC: 29.7 G/DL — LOW (ref 32–36)
MCV RBC AUTO: 82.2 FL — SIGNIFICANT CHANGE UP (ref 80–100)
NRBC # BLD: 0 /100 WBCS — SIGNIFICANT CHANGE UP (ref 0–0)
PLATELET # BLD AUTO: 242 K/UL — SIGNIFICANT CHANGE UP (ref 150–400)
POTASSIUM SERPL-MCNC: 4.4 MMOL/L — SIGNIFICANT CHANGE UP (ref 3.5–5.3)
POTASSIUM SERPL-SCNC: 4.4 MMOL/L — SIGNIFICANT CHANGE UP (ref 3.5–5.3)
PROTHROM AB SERPL-ACNC: 13.7 SEC — HIGH (ref 10.5–13.4)
RBC # BLD: 4.1 M/UL — SIGNIFICANT CHANGE UP (ref 3.8–5.2)
RBC # FLD: 15.6 % — HIGH (ref 10.3–14.5)
SODIUM SERPL-SCNC: 142 MMOL/L — SIGNIFICANT CHANGE UP (ref 135–145)
WBC # BLD: 4.02 K/UL — SIGNIFICANT CHANGE UP (ref 3.8–10.5)
WBC # FLD AUTO: 4.02 K/UL — SIGNIFICANT CHANGE UP (ref 3.8–10.5)

## 2023-01-24 PROCEDURE — 73560 X-RAY EXAM OF KNEE 1 OR 2: CPT | Mod: 26,LT

## 2023-01-24 PROCEDURE — 20985 CPTR-ASST DIR MS PX: CPT | Mod: NC

## 2023-01-24 PROCEDURE — 27447 TOTAL KNEE ARTHROPLASTY: CPT | Mod: LT

## 2023-01-24 PROCEDURE — 27447 TOTAL KNEE ARTHROPLASTY: CPT | Mod: AS,LT

## 2023-01-24 DEVICE — IMP PATELLA SYMMETRIC X3 29X8MM: Type: IMPLANTABLE DEVICE | Site: LEFT | Status: FUNCTIONAL

## 2023-01-24 DEVICE — INSERT TIB BEARING CS X3 SZ 2 10MM: Type: IMPLANTABLE DEVICE | Site: LEFT | Status: FUNCTIONAL

## 2023-01-24 DEVICE — MAKO BONE PIN 3.2MM X 110MM: Type: IMPLANTABLE DEVICE | Site: LEFT | Status: FUNCTIONAL

## 2023-01-24 DEVICE — MAKO BONE PIN 3.2MM X 140MM: Type: IMPLANTABLE DEVICE | Site: LEFT | Status: FUNCTIONAL

## 2023-01-24 DEVICE — COMP FEM CR CMNTLSS BEADED W/ PA SZ 2 LT: Type: IMPLANTABLE DEVICE | Site: LEFT | Status: FUNCTIONAL

## 2023-01-24 DEVICE — BASEPLATE TIB TRIATHLON TRITAN SZ 2: Type: IMPLANTABLE DEVICE | Site: LEFT | Status: FUNCTIONAL

## 2023-01-24 RX ORDER — CEFAZOLIN SODIUM 1 G
2000 VIAL (EA) INJECTION EVERY 8 HOURS
Refills: 0 | Status: DISCONTINUED | OUTPATIENT
Start: 2023-01-24 | End: 2023-01-25

## 2023-01-24 RX ORDER — INFLUENZA VIRUS VACCINE 15; 15; 15; 15 UG/.5ML; UG/.5ML; UG/.5ML; UG/.5ML
0.5 SUSPENSION INTRAMUSCULAR ONCE
Refills: 0 | Status: DISCONTINUED | OUTPATIENT
Start: 2023-01-24 | End: 2023-01-25

## 2023-01-24 RX ORDER — HYDROMORPHONE HYDROCHLORIDE 2 MG/ML
0.5 INJECTION INTRAMUSCULAR; INTRAVENOUS; SUBCUTANEOUS
Refills: 0 | Status: DISCONTINUED | OUTPATIENT
Start: 2023-01-24 | End: 2023-01-24

## 2023-01-24 RX ORDER — SODIUM CHLORIDE 9 MG/ML
1000 INJECTION, SOLUTION INTRAVENOUS
Refills: 0 | Status: DISCONTINUED | OUTPATIENT
Start: 2023-01-24 | End: 2023-01-24

## 2023-01-24 RX ORDER — ONDANSETRON 8 MG/1
4 TABLET, FILM COATED ORAL ONCE
Refills: 0 | Status: COMPLETED | OUTPATIENT
Start: 2023-01-24 | End: 2023-01-24

## 2023-01-24 RX ADMIN — Medication 650 MILLIGRAM(S): at 07:53

## 2023-01-24 RX ADMIN — Medication 30 MILLIGRAM(S): at 21:27

## 2023-01-24 RX ADMIN — SENNA PLUS 2 TABLET(S): 8.6 TABLET ORAL at 21:27

## 2023-01-24 RX ADMIN — ONDANSETRON 4 MILLIGRAM(S): 8 TABLET, FILM COATED ORAL at 12:23

## 2023-01-24 RX ADMIN — SODIUM CHLORIDE 100 MILLILITER(S): 9 INJECTION, SOLUTION INTRAVENOUS at 12:23

## 2023-01-24 RX ADMIN — Medication 30 MILLIGRAM(S): at 14:40

## 2023-01-24 RX ADMIN — Medication 30 MILLIGRAM(S): at 21:45

## 2023-01-24 RX ADMIN — Medication 30 MILLIGRAM(S): at 14:45

## 2023-01-24 RX ADMIN — GABAPENTIN 300 MILLIGRAM(S): 400 CAPSULE ORAL at 17:09

## 2023-01-24 RX ADMIN — Medication 100 MILLIGRAM(S): at 17:08

## 2023-01-24 RX ADMIN — Medication 500 MILLIGRAM(S): at 17:09

## 2023-01-24 RX ADMIN — SODIUM CHLORIDE 115 MILLILITER(S): 9 INJECTION, SOLUTION INTRAVENOUS at 21:27

## 2023-01-24 RX ADMIN — SODIUM CHLORIDE 115 MILLILITER(S): 9 INJECTION, SOLUTION INTRAVENOUS at 14:40

## 2023-01-24 RX ADMIN — SODIUM CHLORIDE 3 MILLILITER(S): 9 INJECTION INTRAMUSCULAR; INTRAVENOUS; SUBCUTANEOUS at 07:48

## 2023-01-24 RX ADMIN — POLYETHYLENE GLYCOL 3350 17 GRAM(S): 17 POWDER, FOR SOLUTION ORAL at 21:27

## 2023-01-24 NOTE — PHYSICAL THERAPY INITIAL EVALUATION ADULT - LIVES WITH, PROFILE
Pt lives alone in an apartment with no steps to enter. The building is equipped with elevator access. All living amenities are located on one level. The bathroom has a tub/shower combination  fixed/ retractable shower head and standard toilet with adequate space to fit a commode over it./alone

## 2023-01-24 NOTE — DISCHARGE NOTE PROVIDER - CARE PROVIDER_API CALL
Osiel Hogue)  [facility]  [address] [place] [address], [address] [place] [address]  Phone: [phone]  Fax: [phone]  Follow Up Time:

## 2023-01-24 NOTE — ASU PREOP CHECKLIST - BP NONINVASIVE SYSTOLIC (MM HG)
Nursing Home Progress Note      Patient Name: Brigette Ramirez   YOB: 1930    Date of Service: 9/5/2018      Facility: [facility] []   [facility] []   [facility] [x]   [facility] []   Other []       CHIEF COMPLAINT:  CMM/LTC     HISTORY OF PRESENT ILLNESS:  [x]  Follow Up visit for coordination of long term care issues and chronic medical management needs.    Vascular dementia/diabetes mellitus/GERD/chronic respiratory failure with hypoxia/impaired mobility and ADLs/ ypertension/age related physcal debility    PAST MEDICAL & SURGICAL HISTORY:  No past medical history on file.   No past surgical history on file.     MEDICATIONS:  Medication administration record for Brigette Ramirez reviewed and reconciled today.    ALLERGIES:  Allergies not on file     REVIEW OF SYSTEMS:  • Appetite: Fair []   Good [x]   Poor []   Weight Loss []  [x]  Weight Stable   Unavoidable Weight Loss []  Tolerating Tube Feeding []    Supplements Provided []   • Constitutional: Negative for fever, chills, diaphoresis or fatigue and weight change.   • HENT: No dysphagia; no new changes to vision/hearing/smell/taste; no epistaxis  • Eyes: Negative for redness and visual disturbance.   • Respiratory: Negative for shortness of breath, chest pain, cough or chest tightness.   • Cardiovascular: Negative for chest pain and palpitations.   • Gastrointestinal: Negative for abdominal distention, abdominal pain and blood in stool.   • Endocrine: Negative for cold intolerance and heat intolerance.   • Genitourinary: Mild dysuria, without hematuria.Negative for hematuria   • Musculoskeletal: Chronic myalgias and arthralgias  • Integumentary: No open wounds, rash or concerning skin lesions  • Neurological: Negative for syncope, weakness and headaches.   • Hematological: Negative for adenopathy. Does not bruise/bleed easily.   • Immunological: Negative for reported allergies or immunological disorders  • Psychological: No depression, anxiety or suicidal  144 ideation    PHYSICAL EXAMINATION:  VITAL SIGNS: /70, HR 78 bpm, RR 20, weight 133    General Appearance:  [x]  Alert   [x]  Oriented x person  [x]  No acute distress    [x]  Confused  []  Disoriented   []  Comatose   Head:  Atraumatic and normocephalic, without obvious abnormality.   Eyes:          PERRLA, conjunctivae and sclerae normal, no Icterus. No pallor. Extra-occular movements are within normal limits.   Ears:  Ears appear intact with no abnormalities noted.   Throat: No oral lesions, no thrush, oral mucosa moist.   Neck: Supple, trachea midline, no thyromegaly, no carotid bruit.   Back:   No kyphoscoliosis. No tenderness to palpation.   Lungs:   Chest shape is normal.  Audible air exchange noted all lung fields.  No wheezing.  Rhonchus, referred upper congestion that is cleared with light cough.     Heart:  Normal S1 and S2, no murmur, no gallop, no rub. No JVD.   Abdomen:   Normal bowel sounds, no masses, no organomegaly. Soft, non-tender, non-distended, no guarding .  No CVA tenderness.     Extremities: Chronic frail stature, edema, cyanosis or clubbing.  Frail build.    Pulses: Pulses palpable and equal bilaterally.   Skin: No bleeding or rash.  Generalized dry skin noted.  Age-related atrophy of skin.   Neurologic: [x] Normal speech []  Normal mental status    [x] Cranial nerves II through XII intact   [x]  No anosmia [x]  DTR 2+ [x]  Proprioception intact  [x]  No focal motor/sensory deficits     Psych/Mood:        [x]  No acute changes []  Depressed  Urinary:        []  Continent  [x]  Incontinent  []  Retention  []  F/C     []  UTI w/treatment in progress  RECORD REVIEW:   • Consult notes []   • Admission and subsequent notes []   •  [x] I have personally reviewed and interpreted available lab data, radiology studies and ECG obtained at time of visit.  [x] Medication Review: I have reviewed the patients active and prn medications at [facility]     ASSESSMENT   Diagnoses and all  orders for this visit:    Chronic respiratory failure with hypoxia (CMS/[facility])    GERD without esophagitis    Type 2 diabetes mellitus treated with insulin (CMS/[facility])    Vascular dementia without behavioral disturbance    Impaired mobility and ADLs    Age-related physical debility    Essential hypertension        PLAN  Continue supportive care as needed for ADLs and mobilization/transfers.  Without signs of increased work of breathing or oxygen supplementation need at time of my exam, nor reported from nursing.  Blood pressure well controlled, at goal.  No reports of any cyclical/persistent hypoglycemia.  Continue dietary/lifestyle modifications to assist in blood sugar control.  No acute behavioral changes.  Continue surveillance labs.  Weight stability noted, approximately 1 pound loss of clinical insignificance.     [x]  Discussed Patient in detail with nursing/staff, addressed all needs today.     [x]  Plan of Care Reviewed   []   PT/OT Reviewed   []  Order Changes  []   Discharge Plans Reviewed         Total face to face time spent with patient 25 minutes, 15 min in counseling.    Jose D Pennington DO.  9/5/2018

## 2023-01-24 NOTE — PHYSICAL THERAPY INITIAL EVALUATION ADULT - GAIT TRAINING, PT EVAL
Pt will be able to ambulate using assistive device up to 200 ft or more, be able to negotiate  12steps safely observing proper gait, posture and prevent falls.

## 2023-01-24 NOTE — CONSULT NOTE ADULT - SUBJECTIVE AND OBJECTIVE BOX
DANAY GALLARDO is a 37y Female s/p ROBOTIC ASSISTED LEFT TOTAL KNEE ARTHROPLASTY      w/ h/o H/O rheumatoid arthritis      denies any chest pain shortness of breath palpitation dizziness lightheadedness nausea vomiting fever or chills    No significant past surgical history        SH: doesnot smoke or drink at this time    No Known Allergies    acetaminophen   IVPB .. 1000 milliGRAM(s) IV Intermittent once  ascorbic acid 500 milliGRAM(s) Oral two times a day  ceFAZolin   IVPB 2000 milliGRAM(s) IV Intermittent every 8 hours  cyclobenzaprine 10 milliGRAM(s) Oral three times a day PRN  gabapentin 300 milliGRAM(s) Oral every 12 hours  HYDROmorphone  Injectable 0.5 milliGRAM(s) IV Push once  influenza   Vaccine 0.5 milliLiter(s) IntraMuscular once  ketorolac   Injectable 30 milliGRAM(s) IV Push every 8 hours  lactated ringers. 1000 milliLiter(s) IV Continuous <Continuous>  multivitamin 1 Tablet(s) Oral daily  ondansetron Injectable 4 milliGRAM(s) IV Push every 6 hours PRN  oxyCODONE    IR 5 milliGRAM(s) Oral every 4 hours PRN  oxyCODONE    IR 10 milliGRAM(s) Oral every 4 hours PRN  pantoprazole    Tablet 40 milliGRAM(s) Oral before breakfast  polyethylene glycol 3350 17 Gram(s) Oral at bedtime  senna 2 Tablet(s) Oral at bedtime    T(C): 36.7 (01-24-23 @ 20:30), Max: 37.2 (01-24-23 @ 07:10)  HR: 93 (01-24-23 @ 20:30) (75 - 93)  BP: 128/84 (01-24-23 @ 20:30) (112/75 - 145/92)  RR: 16 (01-24-23 @ 20:30) (13 - 21)  SpO2: 99% (01-24-23 @ 20:30) (97% - 100%)  HEENT unremarkable  neck no JVD or bruit  heart normal S1 S2 RRR no gallops or rubs  chest clear to auscultation  abd sof nontender non distended +bs  ext no calf tenderness    A/P   DVT PX  pain control  bowel regimen   wound care as per ortho  GI PX  antiemetics prn  incentive spirometer

## 2023-01-24 NOTE — PROGRESS NOTE ADULT - ASSESSMENT
DOS: 1/24/2023    ATTENDING SURGEON: Osiel Hogue MD    ASSISTANT: JUSTA Torres    ANESTHESIA: Spinal + regional    PREOPERATIVE DIAGNOSIS: Left Knee Osteoarthritis M17.9    POST OPERATIVE DIAGNOSIS: Left Knee Osteoarthritis M17.9    OPERATION: Left Total knee arthoplasty    INSTRUMENTATION USED:   [place] triathlon  Femoral component CR Pressfit, size 2  Tibial base plate, pressfit size 2  polyethylene tibial highly cross linked X3 insert CR size 10  Patellar component, symmetric size 29    INDICATION FOR THE PROCEDURE: The patient presented with severe osteoarthritis of the knee and pain with ambulation during daily activities. Conservative management has failed to alleviate the pain. The patient was thus indicated for the above mentioned procedure.    All risks and benefits of the procedure were explained to the patient. The patient fully understood the procedure and freely consented.    PROCEDURE IN DETAIL:  The patient was taken to the operating room and after anesthetic induction, was placed onto the surgical table in a supine position. A well padded tourniquet was placed over the proximal thigh. The skin and operative site were prepped and draped in the usual sterile fashion. A proper timeout was performed prior to the incision. An esmarch was used to exsanguinate the operative lower extremity and the knee was flexed and tourniquet was inflated.    An anterior incision was made over the extensor mechanism extending from the tibial tubercle to proximal pole of the patella. Medial full thickness subcutaneus skin flaps were elevated. A midvastus arthrotomy was performed. Minimal elevation of the MCL was performed. The fat pad was excised and anterior portions of the medial and lateral meniscus were excises. The patella was everted and denervated. The thickness of the patella was measured and provisional cut was made.    The knee was flexed and patellar retracted laterally. Pin and arrays were placed in the distal femur and midshaft tibia to allow for registration of alena landmarks. Both the femur and tibia were  registered in the standard fashion. Next the ACL was transected and osteophytes were removed. The medial and lateral gaps were assessed in both flexion and extension. Adjustments were made to the planned cuts to optimize balance, alignment, rotation, and range of motion. The [email or web address] robotic arm was brought into the field and the cuts were performed on both the tibia and femur and the bone excised.    Using a laminar , both the medial and lateral menisci were removed. Posterior osteophytes were removed with a curved osteotome and pituitary. The knee was flexed and the appropriate sized tibial tray was placed in the correct rotation. Care was taken to ensure no overhanging of the tray. The trial liner and the distal femur was placed. The knee was taken through full range of motion. There was full extension and excellent flexion. The gaps were assessed with the [email or web address] robot and found to be sufficient.     Remaining free hand resection of the patella was performed and a guide was used to drill the peg holes. The knee was taken through full range of motion and the patella was found to track within the trochlea. The tibial tray was pinned in place and keel was punched. The lug holes for the distal femur were drilled.    All the arrays and checkpoints were removed at this time and the cut bone surfaces were thoroughly irrigated with normal saline. The tibia was pressfit and the proper polyethylene insert was placed., followed by the femur and patella. The knee was soaked with betadine and copiously irrigated.  The tourniquet was let down and meticulous hemostasis was obtained.     The arthrotomy was closed with a barbed suture as was the subcutaneus layer and skin. A sterile occlusive dressing was placed. The patient was taken to the recovery room in stable condition. There were no complications during the procedure.  The assistance of a skilled physician assistant was required for the completion of the surgery.

## 2023-01-24 NOTE — OCCUPATIONAL THERAPY INITIAL EVALUATION ADULT - GROSSLY INTACT, SENSORY
History of Present Illness


H&P Date: 18


Chief Complaint: Left lower quadrant pain





This a 55-year-old female who was admitted through the emergency room with 

complaints of severe left lower quadrant pain.  Patient was worked up with CAT 

scan.  Senna evidence of active diverticulitis with a possible mural wall 

abscess.





Patient states her pain is mainly left lower quadrant.  She's had it for 

several days.





Past Medical History


Past Medical History: Cancer, Osteoarthritis (OA)


Additional Past Medical History / Comment(s): Arthritis, Bilateral total knees 

5075-2027, Cervical cancer , total hysterectomy, hiatal hernia, rectal 

fistula status post surgery with recurrence.


History of Any Multi-Drug Resistant Organisms: None Reported


Past Surgical History: Hysterectomy, Joint Replacement


Additional Past Surgical History / Comment(s): Tie wrap for rectal fistula last 

colonoscopy less than one year ago, removal of ovarian tumor which was benign, 

bilateral bunionectomies, bilateral total knee replacements. 17 total 

breast reduction, left and liposuction.


Past Anesthesia/Blood Transfusion Reactions: No Reported Reaction


Past Psychological History: Depression


Smoking Status: Former smoker


Past Alcohol Use History: None Reported


Additional Past Alcohol Use History / Comment(s): Patient is a smoker one pack 

per day since she was 14 years of age.  She denies any medical marijuana, 

marijuana, street drug or alcohol use.  She works cleaning homes.


Past Drug Use History: Marijuana





- Past Family History


  ** Mother


Family Medical History: Cancer, CVA/TIA


Additional Family Medical History / Comment(s): Mother is alive in her 80s with 

history of CVA and resides in a nursing home.





  ** Father


Family Medical History: No Reported History


Additional Family Medical History / Comment(s): Father is alive in his 80s with 

history of hypertension and benign brain tumor.





  ** Brother(s)


Additional Family Medical History / Comment(s): She has 3 brothers and one has 

 from a drug overdose.  She does not have any sisters.  She has one 

daughter that was an adopted niece.





Medications and Allergies


 Home Medications











 Medication  Instructions  Recorded  Confirmed  Type


 


ALPRAZolam 1 mg PO HS 06/15/18 History


 


Atorvastatin [Lipitor] 40 mg PO DAILY 17 History


 


Metoprolol Succinate (ER) [Toprol 25 mg PO DAILY 17 History





Xl]    


 


Triamterene-Hctz 37.5-25Mg 1 cap PO DAILY 17 History





[Dyazide 37.5-25 Capsule]    


 


Ciprofloxacin HCl [Cipro] 500 mg PO Q12HR 18 History


 


Phentermine HCl [Adipex-P] 37.5 mg PO QAM 18 History


 


Potassium 99 mg PO DAILY 18 History


 


Vitamin B Complex 1 cap PO DAILY 18 History











 Allergies











Allergy/AdvReac Type Severity Reaction Status Date / Time


 


hydromorphone HCl AdvReac Severe Nausea & Verified 18 18:38





[From Dilaudid]   Vomiting  














Surgical - Exam


 Vital Signs











Temp Pulse Resp BP Pulse Ox


 


 99.5 F   95   16   126/82   97 


 


 18 18:27  18 18:27  18 18:27  18 18:27  18 18:27














- General


well developed, moderate distress





- Eyes


PERRL





- ENT


normal pinna





- Neck


no masses





- Respiratory


normal expansion





- Cardiovascular


Rhythm: regular





- Abdomen





Marked left lower quadrant tenderness


Abdomen: soft





Results





- Labs





 18 06:38





 18 06:38


 Abnormal Lab Results - Last 24 Hours (Table)











  18 Range/Units





  19:50 19:50 19:50 


 


WBC   11.3 H   (3.8-10.6)  k/uL


 


Neutrophils #   8.6 H   (1.3-7.7)  k/uL


 


Chloride  95 L    ()  mmol/L


 


Carbon Dioxide  32 H    (22-30)  mmol/L


 


Calcium     (8.4-10.2)  mg/dL


 


Total Protein     (6.3-8.2)  g/dL


 


Albumin     (3.5-5.0)  g/dL


 


Urine Appearance    Cloudy H  (Clear)  


 


Urine Protein    1+ H  (Negative)  


 


Urine Blood    Trace H  (Negative)  


 


Ur Leukocyte Esterase    Small H  (Negative)  


 


Urine WBC    15 H  (0-5)  /hpf


 


Ur Squamous Epith Cells    9 H  (0-4)  /hpf


 


Urine Bacteria    Occasional H  (None)  /hpf


 


Urine Mucus    Rare H  (None)  /hpf














  18 Range/Units





  06:38 


 


WBC   (3.8-10.6)  k/uL


 


Neutrophils #   (1.3-7.7)  k/uL


 


Chloride   ()  mmol/L


 


Carbon Dioxide  31 H  (22-30)  mmol/L


 


Calcium  8.3 L  (8.4-10.2)  mg/dL


 


Total Protein  5.2 L  (6.3-8.2)  g/dL


 


Albumin  2.9 L  (3.5-5.0)  g/dL


 


Urine Appearance   (Clear)  


 


Urine Protein   (Negative)  


 


Urine Blood   (Negative)  


 


Ur Leukocyte Esterase   (Negative)  


 


Urine WBC   (0-5)  /hpf


 


Ur Squamous Epith Cells   (0-4)  /hpf


 


Urine Bacteria   (None)  /hpf


 


Urine Mucus   (None)  /hpf








 Microbiology - Last 24 Hours (Table)











 18 20:46 Urine Culture - Preliminary





 Urine,Voided 








 Diabetes panel











  18 Range/Units





  19:50 06:38 


 


Sodium  138  139  (137-145)  mmol/L


 


Potassium  3.8  3.7  (3.5-5.1)  mmol/L


 


Chloride  95 L  101  ()  mmol/L


 


Carbon Dioxide  32 H  31 H  (22-30)  mmol/L


 


BUN  12  10  (7-17)  mg/dL


 


Creatinine  0.80  0.65  (0.52-1.04)  mg/dL


 


Glucose  99  91  (74-99)  mg/dL


 


Calcium  9.3  8.3 L  (8.4-10.2)  mg/dL


 


AST  23  18  (14-36)  U/L


 


ALT  37  28  (9-52)  U/L


 


Alkaline Phosphatase  81  64  ()  U/L


 


Total Protein  6.4  5.2 L  (6.3-8.2)  g/dL


 


Albumin  3.6  2.9 L  (3.5-5.0)  g/dL








 Calcium panel











  18 Range/Units





  19:50 06:38 


 


Calcium  9.3  8.3 L  (8.4-10.2)  mg/dL


 


Albumin  3.6  2.9 L  (3.5-5.0)  g/dL








 Pituitary panel











  18 Range/Units





  19:50 06:38 


 


Sodium  138  139  (137-145)  mmol/L


 


Potassium  3.8  3.7  (3.5-5.1)  mmol/L


 


Chloride  95 L  101  ()  mmol/L


 


Carbon Dioxide  32 H  31 H  (22-30)  mmol/L


 


BUN  12  10  (7-17)  mg/dL


 


Creatinine  0.80  0.65  (0.52-1.04)  mg/dL


 


Glucose  99  91  (74-99)  mg/dL


 


Calcium  9.3  8.3 L  (8.4-10.2)  mg/dL








 Adrenal panel











  18 Range/Units





  19:50 06:38 


 


Sodium  138  139  (137-145)  mmol/L


 


Potassium  3.8  3.7  (3.5-5.1)  mmol/L


 


Chloride  95 L  101  ()  mmol/L


 


Carbon Dioxide  32 H  31 H  (22-30)  mmol/L


 


BUN  12  10  (7-17)  mg/dL


 


Creatinine  0.80  0.65  (0.52-1.04)  mg/dL


 


Glucose  99  91  (74-99)  mg/dL


 


Calcium  9.3  8.3 L  (8.4-10.2)  mg/dL


 


Total Bilirubin  0.5  0.3  (0.2-1.3)  mg/dL


 


AST  23  18  (14-36)  U/L


 


ALT  37  28  (9-52)  U/L


 


Alkaline Phosphatase  81  64  ()  U/L


 


Total Protein  6.4  5.2 L  (6.3-8.2)  g/dL


 


Albumin  3.6  2.9 L  (3.5-5.0)  g/dL














- Imaging


CT scan - pelvis: report reviewed (Acute diverticulitis with abscess measuring 

3 x 4 cm along the mesenteric border of the colon.)





Assessment and Plan


Assessment: 





Acute diverticulitis.  Patient will be observed she is currently receiving IV 

antibiotics.  If her condition does not improve she may need exploratory 

laparotomy with possible colostomy.  I discussed the slight the patient and her 

.
Left UE/Right UE

## 2023-01-24 NOTE — PATIENT PROFILE ADULT - HAVE YOU EXPERIENCED VIOLENCE OR A TRAUMATIC EVENT?
Dr. Gage Peace patient. I will be seeing him in the future. Spoke with Dr. Morteza Ireland. Cell: [phone]  Office: [phone]    He is the patient's dentist.  Patient is complaining of increased salivation. This could be due to donepezil. OK to use a small dose of glycopyrrolate (peripheral anticholinerigic, not centrally active) to treat. He wanted to give 1 mg daily. I suggested a smaller dose of every other day or every third day. no

## 2023-01-24 NOTE — OCCUPATIONAL THERAPY INITIAL EVALUATION ADULT - ADDITIONAL COMMENTS
Pt lives alone in an apartment (sister able to assist post-operatively) c no stairs to enter and elevator to reach main bedroom/bathroom. Pt's bathroom is equipped c a tub/shower-combo, retractable shower head, and standard height toilet seat. Pt reports there is adequate space over toilet to fit 3:1 commode. Pt is right hand dominant and does not wears glasses.

## 2023-01-24 NOTE — DISCHARGE NOTE PROVIDER - NSDCFUADDAPPT_GEN_ALL_CORE_FT
Follow up with your surgeon in two weeks. Call for appointment.  If you need more pain medication, call your surgeon's office. For medication refills or authorizations, please call [phone] xt [phone]  We recommend that you call and schedule a follow up appointment within 2-4 weeks with your primary care physician for repeat blood work (CBC and BMP) for post hospital discharge follow-up care.  Call your surgeon if you have increased redness/pain/drainage or fever. Return to ER for shortness of breath/calf tenderness.

## 2023-01-24 NOTE — OCCUPATIONAL THERAPY INITIAL EVALUATION ADULT - GENERAL OBSERVATIONS, REHAB EVAL
Pt encountered semisupine in bed, NAD, AXOX4, +heplock, +ACE L knee c/d/i, no c/o pain s/p left TKA.

## 2023-01-24 NOTE — DISCHARGE NOTE PROVIDER - HOSPITAL COURSE
37yFemale with history of Left knee OA presenting for L TKA by Dr. Hogue on 1/24/23. Risk and benefits of surgery were explained to the patient. The patient understood and agreed to proceed with surgery. Patient underwent the procedure with no intraoperative complications. Pt was brought in stable condition to the PACU. Once stable in PACU, pt was brought to the floor. During hospital stay pt was followed by Medicine, physical therapy, Home Care during this admission. Pt is stable for discharge

## 2023-01-24 NOTE — OCCUPATIONAL THERAPY INITIAL EVALUATION ADULT - STRENGTHENING, PT EVAL
Pt will increase left lower extremity strength to 4+/5 to improve functional strength needed to engage in functional tasks by 4 weeks

## 2023-01-24 NOTE — DISCHARGE NOTE PROVIDER - NSDCFUADDINST_GEN_ALL_CORE_FT
nylon
Keep knee straight while at rest. Elevate the leg as much as possible ("toes above the nose") to help control swelling. Make sure you get up and take a brief walk every two hours to help with circulation and prevent stiffness. Incentive spirometer 10X/hour. Cryocuff to help with pain/inflammation.   Remove ACE wrap to shower. May shower with Prineo Dressing underneath but please cover with saran wrap. Please do not scrub, soak, peel or pick at the prineo dressing. No creams, lotions, or oils over dressing.  Pat dry once out of shower. then wrap again with ACE bandage. Dressing to be removed in office at follow up visit in 2 weeks. There are no staples or stitches that need to be removed.  If you notice any redness, irritation, or itching around the prineo dressing call the surgeon's office

## 2023-01-24 NOTE — DISCHARGE NOTE PROVIDER - NSDCMRMEDTOKEN_GEN_ALL_CORE_FT
Medrol 8 mg oral tablet: orally once a day  please perform covid pcr:    acetaminophen 500 mg oral tablet: 2 tab(s) orally every 8 hours   ascorbic acid 500 mg oral tablet: 1 tab(s) orally 2 times a day  aspirin 81 mg oral delayed release tablet: 1 tab(s) orally 2 times a day x 30 days MDD:2  celecoxib 200 mg oral capsule: 1 cap(s) orally every 12 hours x 30 days MDD:2  Colace 100 mg oral capsule: 1 cap(s) orally 2 times a day MDD:2  cyclobenzaprine 10 mg oral tablet: 1 tab(s) orally every 8 hours MDD:3  gabapentin 300 mg oral capsule: 1 cap(s) orally 2 times a day MDD:2  Medrol 8 mg oral tablet: orally once a day  Multiple Vitamins oral tablet: 1 tab(s) orally once a day  Narcan 4 mg/0.1 mL nasal spray: 4 milligram(s) intranasally once, repeat as necessary.   Required with Opioid Rx  As needed. For suspected opiate overdose   Follow instructions on packet MDD:0.2 ml  NexIUM 20 mg oral delayed release capsule: 1 cap(s) orally once a day MDD:1  oxyCODONE 5 mg oral tablet: 1-2 tab(s) orally every 4 hours, As needed, for pain MDD:10

## 2023-01-24 NOTE — OCCUPATIONAL THERAPY INITIAL EVALUATION ADULT - LEVEL OF INDEPENDENCE, REHAB EVAL
Pt. c fever for the last few days.  Pt. Went to clinic and tested negative for flu.  Per mother pt. Is followed per Dr. Naylor for a possible immune deficiency.  Dr. Naylor told them to take a dose of steroids, which mother did.  Today pt. Still c fever and one episode of vomiting after tylenol administration around 1700.  Pt. Also c cough and more tired than normal.  No other s/s or complaints.     APPEARANCE: No acute distress.    NEURO: Awake, alert, appropriate for age  HEENT: Head symmetrical. Eyes bilateral.  RESPIRATORY: Airway is open and patent. Respirations are spontaneous on room air.   NEUROVASCULAR: All extremities are warm and pink with capillary refill less than 3 seconds.   MUSCULOSKELETAL: Moves all extremities, wiggling toes and moving hands.   SKIN: Warm and dry, adequate turgor, mucus membranes moist and pink  SOCIAL: Patient is accompanied by family.   Will continue to monitor.       supervision

## 2023-01-24 NOTE — PATIENT PROFILE ADULT - FALL HARM RISK - RISK INTERVENTIONS
Name Of Product 29: ZO Broad SPF 50 Product 49 Units: 0 Product 5 Application Directions: Apply QAM Name Of Product 14: Dual Action Scrub Product 19 Application Directions: Apply QHS as tolerated Product 8 Price (In Dollars - Numeric Only, No Special Characters Or $): 0.00 Product 26 Application Directions: Apply to entire face BID after pads Product 36 Application Directions: QHS as tolerated Product 31 Application Directions: Apply [facility] Product 16 Application Directions: Apply to entire face QOS as tolerated Name Of Product 6: Daily Power Defense Product 24 Application Directions: Apply to entire face BID after cleansing Name Of Product 1: Gentle cleanser Product 11 Application Directions: Apply BID Name Of Product 20: Retinol Brightener lorena Product 8 Units: 1 Name Of Product 32: Growth Factor Serum Name Of Product 27: SPF Brush 40 medium Name Of Product 17: Complexion renewal pads Product 3 Application Directions: Apply to entire face BID after Daily Power Defense Name Of Product 12: Exfoliating polish travel size Name Of Product 25: Hydrafirm / Brightening Eye Cream Product 29 Application Directions: Apply to entire face QAM and reapply as needed. Product 22 Application Directions: Apply to eyelids and crows feet BID Product 34 Application Directions: Apply to eyes BID Detail Level: Zone Product 14 Application Directions: Scrub Every other day after cleansing Name Of Product 9: Pigment HQ4% blending RX Name Of Product 4: Pigment control HQ4% RX Name Of Product 18: ZO SPF Primer Name Of Product 35: Tretinoin 0.1% Product 1 Application Directions: Wash BID Name Of Product 30: Growth Factor Serum Eye Name Of Product 23: Gentle cleanser travel size Product 6 Application Directions: Apply to entire face QHS Name Of Product 10: Complexion Renewal Kit Assigning Risk Information: Per AMA, level of risk is based upon consequences of the problem(s) addressed at the encounter when appropriately treated. Risk also includes medical decision making related to the need to initiate or forego further testing, treatment and/or hospitalization. Over the counter medication are assigned a risk level of low. Prescription medication management is assigned a risk level of moderate. Product 32 Application Directions: Apply to entire face QOS or alternating with Retinol Product 27 Application Directions: Apply to entire face QAM Name Of Product 7: Instant Pore refiner Name Of Product 2: Exfoliating polish Risk Of Complication Category: No MDM Name Of Product 21: Oil Control Pads Name Of Product 28: Hydrating Cream Name Of Product 33: Complexion clearing mask Name Of Product 13: Radical night repair Allow Plan To Count Towards E/M Coding: No (this will remove associated impression from E/M calculation) Product 4 Application Directions: Apply to entire face for pigmentation QAM Product 30 Application Directions: Apply QHS alternating with Retinol Brightener Product 25 Application Directions: Apply to periorbital region QHS Product 35 Application Directions: Apply to entire face QHS PRN Product 15 Application Directions: Apply to face BID Product 23 Application Directions: Cleanse face BID Product 10 Application Directions: Apply to entire face BID as directed Name Of Product 19: Tretinoin 0.05% (20g) Name Of Product 5: Vitamin C serum Name Of Product 36: Enzymatic peel Send Charges To Patient Encounter: No Communicate Fall Risk and Risk Factors to all staff, patient, and family/Reinforce activity limits and safety measures with patient and family/Visual Cue: Yellow wristband/Bed in lowest position, wheels locked, appropriate side rails in place/Call bell, personal items and telephone in reach/Instruct patient to call for assistance before getting out of bed or chair/Non-slip footwear when patient is out of bed/[place] to call system/Physically safe environment - no spills, clutter or unnecessary equipment/Purposeful Proactive Rounding/Room/bathroom lighting operational, light cord in reach Name Of Product 26: Susanve Name Of Product 31: Wrinkle and Texture Repair Product 2 Application Directions: 3 to 5 x week after cleansing Name Of Product 11: Firming Serum Product 7 Application Directions: Apply to entire face BID Name Of Product 16: Wrinkle Texture Repair Name Of Product 24: Calming toner Product 21 Application Directions: Swab BID after cleansing Product 28 Application Directions: Apply to entire face QOS PRN alternating with Retin A Product 33 Application Directions: 1-2 x week Name Of Product 3: Rozatrol lorena Name Of Product 8: Exfoliating accelerator Product 13 Application Directions: Apply to entire face QHS after cleansing Name Of Product 34: Growth Factor Eye Serum Name Of Product 22: Intense Eye Repair Cream rojelioe

## 2023-01-24 NOTE — PROGRESS NOTE ADULT - SUBJECTIVE AND OBJECTIVE BOX
Patient is 37y y/o Female s/p L TKA POD#0  Patient is seen and examined at bedside.   Pt tolerated procedure well without any intra-op complications.    Pain is controlled.  Denies CP/SOB/Dizziness/N/V/D/HA.     Vital Signs Last 24 Hrs  T(C): 36.7 (24 Jan 2023 13:54), Max: 37.2 (24 Jan 2023 07:10)  T(F): 98 (24 Jan 2023 13:54), Max: 98.9 (24 Jan 2023 07:10)  HR: 88 (24 Jan 2023 13:54) (75 - 88)  BP: 133/86 (24 Jan 2023 13:54) (112/75 - 144/91)  BP(mean): --  RR: 16 (24 Jan 2023 13:54) (13 - 21)  SpO2: 98% (24 Jan 2023 13:54) (97% - 100%)    Parameters below as of 24 Jan 2023 13:54  Patient On (Oxygen Delivery Method): room air          PHYSICAL EXAM:  General: A&Ox3 NAD  LLE: Dressing C/D/I with ACE wrap in place. Motor intact + EHL/FHL/TA/GS.  Sensation is grossly intact.  Extremity warm, compartments soft, compressible. No calf tenderness. DP 2+   RLE: Motor intact +EHL/FHL/TA/GS. Sensation is grossly intact. Extremity warm, compartments soft, compressible. No calf tenderness. DP2+    Labs:                          10.0   4.02  )-----------( 242      ( 24 Jan 2023 12:26 )             33.7       01-24    142  |  113<H>  |  9   ----------------------------<  96  4.4   |  24  |  0.51    Ca    8.5      24 Jan 2023 12:26        A/P: Patient is a 37y y/o Female s/p L TKA, POD # 0  -wound care, knee extension/leg elevation, cryocuff, isometric exercises, new medications reviewed with pt  -Pain control/analgesia  -Inc spirometry reviewed with pt, demonstrated competence  -DVT prophylaxis with Venodynes/Aspirin 81 BID  -F/U AM Labs  -PT/OT/WBAT  -prophylactic Antibiotic  -medical consult  -DC planning

## 2023-01-24 NOTE — PHYSICAL THERAPY INITIAL EVALUATION ADULT - ADDITIONAL COMMENTS
Lives alone in [facility] apt with elevator access and no step to negotiate. Sister will stay post op with her to assist

## 2023-01-24 NOTE — PATIENT PROFILE ADULT - NSPROPTRIGHTNOTIFY_GEN_A_NUR
declines Lab Facility: 0 Bill For Surgical Tray: no Performing Laboratory: [id number] Billing Type: Third-Party Bill Expected Date Of Service: 02/23/2022

## 2023-01-25 ENCOUNTER — TRANSCRIPTION ENCOUNTER (OUTPATIENT)
Age: 38
End: 2023-01-25

## 2023-01-25 VITALS — HEART RATE: 97 BPM | SYSTOLIC BLOOD PRESSURE: 136 MMHG | DIASTOLIC BLOOD PRESSURE: 76 MMHG | OXYGEN SATURATION: 98 %

## 2023-01-25 LAB
ANION GAP SERPL CALC-SCNC: 5 MMOL/L — SIGNIFICANT CHANGE UP (ref 5–17)
BUN SERPL-MCNC: 13 MG/DL — SIGNIFICANT CHANGE UP (ref 7–23)
CALCIUM SERPL-MCNC: 8.1 MG/DL — LOW (ref 8.5–10.1)
CHLORIDE SERPL-SCNC: 110 MMOL/L — HIGH (ref 96–108)
CO2 SERPL-SCNC: 27 MMOL/L — SIGNIFICANT CHANGE UP (ref 22–31)
CREAT SERPL-MCNC: 0.48 MG/DL — LOW (ref 0.5–1.3)
EGFR: 125 ML/MIN/1.73M2 — SIGNIFICANT CHANGE UP
GLUCOSE SERPL-MCNC: 105 MG/DL — HIGH (ref 70–99)
HCT VFR BLD CALC: 29.1 % — LOW (ref 34.5–45)
HGB BLD-MCNC: 8.6 G/DL — LOW (ref 11.5–15.5)
MCHC RBC-ENTMCNC: 24.6 PG — LOW (ref 27–34)
MCHC RBC-ENTMCNC: 29.6 G/DL — LOW (ref 32–36)
MCV RBC AUTO: 83.1 FL — SIGNIFICANT CHANGE UP (ref 80–100)
NRBC # BLD: 0 /100 WBCS — SIGNIFICANT CHANGE UP (ref 0–0)
PLATELET # BLD AUTO: 226 K/UL — SIGNIFICANT CHANGE UP (ref 150–400)
POTASSIUM SERPL-MCNC: 3.6 MMOL/L — SIGNIFICANT CHANGE UP (ref 3.5–5.3)
POTASSIUM SERPL-SCNC: 3.6 MMOL/L — SIGNIFICANT CHANGE UP (ref 3.5–5.3)
RBC # BLD: 3.5 M/UL — LOW (ref 3.8–5.2)
RBC # FLD: 15.6 % — HIGH (ref 10.3–14.5)
SODIUM SERPL-SCNC: 142 MMOL/L — SIGNIFICANT CHANGE UP (ref 135–145)
WBC # BLD: 7.49 K/UL — SIGNIFICANT CHANGE UP (ref 3.8–10.5)
WBC # FLD AUTO: 7.49 K/UL — SIGNIFICANT CHANGE UP (ref 3.8–10.5)

## 2023-01-25 RX ORDER — ESOMEPRAZOLE MAGNESIUM 40 MG/1
1 CAPSULE, DELAYED RELEASE ORAL
Qty: 30 | Refills: 0
Start: 2023-01-25 | End: 2023-02-23

## 2023-01-25 RX ORDER — OXYCODONE HYDROCHLORIDE 5 MG/1
1 TABLET ORAL
Qty: 42 | Refills: 0
Start: 2023-01-25 | End: 2023-01-31

## 2023-01-25 RX ORDER — CYCLOBENZAPRINE HYDROCHLORIDE 10 MG/1
1 TABLET, FILM COATED ORAL
Qty: 15 | Refills: 0
Start: 2023-01-25 | End: 2023-01-29

## 2023-01-25 RX ORDER — NALOXONE HYDROCHLORIDE 4 MG/.1ML
4 SPRAY NASAL
Qty: 1 | Refills: 0
Start: 2023-01-25 | End: 2023-01-25

## 2023-01-25 RX ORDER — ACETAMINOPHEN 500 MG
2 TABLET ORAL
Qty: 30 | Refills: 0
Start: 2023-01-25 | End: 2023-01-29

## 2023-01-25 RX ORDER — GABAPENTIN 400 MG/1
1 CAPSULE ORAL
Qty: 28 | Refills: 0
Start: 2023-01-25 | End: 2023-02-07

## 2023-01-25 RX ORDER — ASCORBIC ACID 60 MG
1 TABLET,CHEWABLE ORAL
Qty: 0 | Refills: 0 | DISCHARGE
Start: 2023-01-25

## 2023-01-25 RX ORDER — DOCUSATE SODIUM 100 MG
1 CAPSULE ORAL
Qty: 14 | Refills: 0
Start: 2023-01-25 | End: 2023-01-31

## 2023-01-25 RX ORDER — CELECOXIB 200 MG/1
1 CAPSULE ORAL
Qty: 60 | Refills: 0
Start: 2023-01-25 | End: 2023-02-23

## 2023-01-25 RX ORDER — ASPIRIN/CALCIUM CARB/MAGNESIUM 324 MG
1 TABLET ORAL
Qty: 60 | Refills: 0
Start: 2023-01-25 | End: 2023-02-23

## 2023-01-25 RX ADMIN — Medication 30 MILLIGRAM(S): at 05:34

## 2023-01-25 RX ADMIN — CELECOXIB 200 MILLIGRAM(S): 200 CAPSULE ORAL at 06:22

## 2023-01-25 RX ADMIN — Medication 500 MILLIGRAM(S): at 05:33

## 2023-01-25 RX ADMIN — Medication 81 MILLIGRAM(S): at 05:34

## 2023-01-25 RX ADMIN — Medication 1000 MILLIGRAM(S): at 04:24

## 2023-01-25 RX ADMIN — OXYCODONE HYDROCHLORIDE 10 MILLIGRAM(S): 5 TABLET ORAL at 01:15

## 2023-01-25 RX ADMIN — OXYCODONE HYDROCHLORIDE 10 MILLIGRAM(S): 5 TABLET ORAL at 12:49

## 2023-01-25 RX ADMIN — Medication 400 MILLIGRAM(S): at 03:57

## 2023-01-25 RX ADMIN — OXYCODONE HYDROCHLORIDE 10 MILLIGRAM(S): 5 TABLET ORAL at 09:45

## 2023-01-25 RX ADMIN — GABAPENTIN 300 MILLIGRAM(S): 400 CAPSULE ORAL at 05:33

## 2023-01-25 RX ADMIN — OXYCODONE HYDROCHLORIDE 10 MILLIGRAM(S): 5 TABLET ORAL at 08:51

## 2023-01-25 RX ADMIN — Medication 100 MILLIGRAM(S): at 00:55

## 2023-01-25 RX ADMIN — Medication 102 MILLIGRAM(S): at 05:33

## 2023-01-25 RX ADMIN — SODIUM CHLORIDE 115 MILLILITER(S): 9 INJECTION, SOLUTION INTRAVENOUS at 05:33

## 2023-01-25 RX ADMIN — PANTOPRAZOLE SODIUM 40 MILLIGRAM(S): 20 TABLET, DELAYED RELEASE ORAL at 05:33

## 2023-01-25 RX ADMIN — OXYCODONE HYDROCHLORIDE 5 MILLIGRAM(S): 5 TABLET ORAL at 04:31

## 2023-01-25 RX ADMIN — OXYCODONE HYDROCHLORIDE 5 MILLIGRAM(S): 5 TABLET ORAL at 05:30

## 2023-01-25 RX ADMIN — Medication 1 TABLET(S): at 11:46

## 2023-01-25 RX ADMIN — OXYCODONE HYDROCHLORIDE 10 MILLIGRAM(S): 5 TABLET ORAL at 00:22

## 2023-01-25 RX ADMIN — CELECOXIB 200 MILLIGRAM(S): 200 CAPSULE ORAL at 05:33

## 2023-01-25 RX ADMIN — Medication 30 MILLIGRAM(S): at 05:50

## 2023-01-25 NOTE — DISCHARGE NOTE NURSING/CASE MANAGEMENT/SOCIAL WORK - NSDCFUADDAPPT_GEN_ALL_CORE_FT
Follow up with your surgeon in two weeks. Call for appointment.  If you need more pain medication, call your surgeon's office. For medication refills or authorizations, please call [phone]  We recommend that you call and schedule a follow up appointment within 2-4 weeks with your primary care physician for repeat blood work (CBC and BMP) for post hospital discharge follow-up care.  Call your surgeon if you have increased redness/pain/drainage or fever. Return to ER for shortness of breath/calf tenderness.

## 2023-01-25 NOTE — DISCHARGE NOTE NURSING/CASE MANAGEMENT/SOCIAL WORK - PATIENT PORTAL LINK FT
You can access the FollowMyHealth Patient Portal offered by [facility] by registering at the following website: [email or web address]. By joining [facility]’s FollowMyHealth portal, you will also be able to view your health information using other applications (apps) compatible with our system.

## 2023-01-25 NOTE — PROGRESS NOTE ADULT - SUBJECTIVE AND OBJECTIVE BOX
Patient is 37y y/o Female s/p L TKA POD#1  Patient is seen and examined at bedside.   Pt tolerated procedure well without any intra-op complications.    Pain is controlled.  Denies CP/SOB/Dizziness/N/V/D/HA.     Vital Signs Last 24 Hrs  T(C): 36.6 (25 Jan 2023 08:34), Max: 36.8 (24 Jan 2023 12:09)  T(F): 97.9 (25 Jan 2023 08:34), Max: 98.3 (25 Jan 2023 04:34)  HR: 80 (25 Jan 2023 08:34) (75 - 93)  BP: 146/95 (25 Jan 2023 08:34) (112/75 - 146/95)  BP(mean): --  RR: 18 (25 Jan 2023 08:34) (13 - 21)  SpO2: 99% (25 Jan 2023 08:34) (98% - 100%)    Parameters below as of 25 Jan 2023 08:34  Patient On (Oxygen Delivery Method): room air          PHYSICAL EXAM:  General: A&Ox3 NAD  LLE: Dressing C/D/I with ACE wrap in place. Motor intact + EHL/FHL/TA/GS.  Sensation is grossly intact.  Extremity warm, compartments soft, compressible. No calf tenderness. DP 2+   RLE: Motor intact +EHL/FHL/TA/GS. Sensation is grossly intact. Extremity warm, compartments soft, compressible. No calf tenderness. DP2+    Labs:                          8.6    7.49  )-----------( 226      ( 25 Jan 2023 06:05 )             29.1       01-25    142  |  110<H>  |  13  ----------------------------<  105<H>  3.6   |  27  |  0.48<L>    Ca    8.1<L>      25 Jan 2023 06:05        A/P: Patient is a 37y y/o Female s/p L TKA, POD # 1  -wound care, knee extension/leg elevation, cryocuff, isometric exercises, new medications reviewed with pt  -Pain control/analgesia  -Inc spirometry reviewed with pt, demonstrated competence  -DVT prophylaxis with Venodynes/Aspirin 81 BID  -F/U AM Labs  -PT/OT/WBAT  -prophylactic Antibiotic  -medical consult  -DC planning

## 2023-01-25 NOTE — DISCHARGE NOTE NURSING/CASE MANAGEMENT/SOCIAL WORK - NSDCPEFALRISK_GEN_ALL_CORE
For information on Fall & Injury Prevention, visit: [email or web address] OR  [email or web address] OR  https://www.cdc.gov/steadi/patient.html

## 2023-01-28 DIAGNOSIS — M17.12 UNILATERAL PRIMARY OSTEOARTHRITIS, LEFT KNEE: ICD-10-CM

## 2023-02-09 ENCOUNTER — APPOINTMENT (OUTPATIENT)
Dept: ORTHOPEDIC SURGERY | Facility: CLINIC | Age: 38
End: 2023-02-09
Payer: COMMERCIAL

## 2023-02-09 VITALS — BODY MASS INDEX: 34.53 KG/M2 | WEIGHT: 220 LBS | HEIGHT: 67 IN

## 2023-02-09 PROCEDURE — 73562 X-RAY EXAM OF KNEE 3: CPT | Mod: LT

## 2023-02-09 PROCEDURE — 99024 POSTOP FOLLOW-UP VISIT: CPT

## 2023-02-09 NOTE — IMAGING
[de-identified] : Constitutional: well developed and well nourished, able to communicate\par Cardiovascular: Peripheral vascular exam is grossly normal\par Neurologic: Alert and oriented, no acute distress.\par Skin: normal skin with no ulcers, rashes, or lesions\par Pulmonary: No respiratory distress, breathing comfortably on room air\par Lymphatics: No obvious lymphadenopathy or lymphedema in areas examined\par \par Knee Exam\par Alignment: Valgus \par Effusion: None\par Atrophy: None                                                \par Stable to Varus/valgus stress\par Posterior Drawer Test: negative\par Anterior Drawer Test: Negative\par Knee Extension/Flexion: 0 / 120\par \par Medial/lateral compartments\par Medial joint line: POS Tenderness\par Lateral joint line: POS Tenderness\par Jennifer test: negative\par \par Patellofemoral joint\par Medial patellar facet: no tenderness\par Patellar grind: Negative\par \par Tendons:\par Pes Anserine: No tenderness\par Gerdy’s Tubercle/ IT Band: No tenderness\par Quadriceps Tendon: No Tenderness\par patellar tendon: no Tenderness\par Tibial tubercle: not tenderness\par Calf: no Tenderness\par \par Neurovascular exam\par Muscle strength: 5/5\par Sensation to light touch: intact\par Distal pulses: 2+\par \par IMAGING:\par 12/22/22: 4v of L knee showing valgus OA, bone on bone arthritis, lateral joint collapse, sclerosis and osteophytes noted

## 2023-02-09 NOTE — PHYSICAL EXAM
[de-identified] : Constitutional: well developed and well nourished, able to communicate\par Cardiovascular: Peripheral vascular exam is grossly normal\par Neurologic: Alert and oriented, no acute distress.\par Skin: normal skin with no ulcers, rashes, or lesions\par Pulmonary: No respiratory distress, breathing comfortably on room air\par Lymphatics: No obvious lymphadenopathy or lymphedema in areas examined\par  \par POSTOP LEFT KNEE EXAM\par Edema: mild\par well healing surgical incision without surrounding erythema/drainage\par \par ROM\par 0-90 degrees of flexion\par Stable to varus/valgus stress\par Stable to Anterior/posterior drawer test\par \par Neurovascular exam\par Motor function 5/5 distal lower extremity\par Sensation to light touch: intact\par Distal pulses: 2+\par \par XR of the L knee today demonstrate TKA in place w/o signs of interval changes from postoperative XR

## 2023-02-09 NOTE — HISTORY OF PRESENT ILLNESS
[3] : 3 [0] : 0 [Sharp] : sharp [Throbbing] : throbbing [Intermittent] : intermittent [Household chores] : household chores [Leisure] : leisure [Work] : work [Nothing helps with pain getting better] : Nothing helps with pain getting better [de-identified] : 12/22/22: 36 y/o F with PMHx of RA. Has known OA to L knee. Has had CSI and gel injections. States the last gel was in June, no relieve but was done with rheumatology. States the first injection had great relieve. States CSI no longer giving her relieve. \par \par PMH: RA (no biologic agents), no hx of blood clots, no bloodthinners\par \par 02/09/2023 post visit left knee pt states she feels better with no pain. [] : Post Surgical Visit: no [FreeTextEntry1] : left knee [FreeTextEntry5] : pt feels pain and pressure in the knee [FreeTextEntry7] : left hip [de-identified] : motion/pressure

## 2023-02-09 NOTE — ASSESSMENT
[FreeTextEntry1] : 38 y/o F with severe L knee OA\par \par \par Osiel ROSE M.D. discussed the patient’s diagnosis and treatment options, non-surgical and surgical, with the patient and/or legal guardian including the potential benefits and complications of each. Potential complications of non-surgical treatments discussed include residual pain and/or disability, non-healing, progression of symptoms and/or disease. Potential complications of surgery discussed include infection, nerve injury, vascular injury, cartilage injury, ligament injury, tendon injury, muscle injury, skin injury, stiffness, instability, weakness, persistent pain, technical or hardware failure, need for additional surgery, re-injury, medical complication, anesthetic related complication, and/or death. All questions were answered. The patient and/or legal guardian has elected to proceed with surgery. Informed consent obtained for Left CAT TKA\par \par                   \par Preoperative evaluation and testing as needed is advised within 30 days prior to the procedure.\par  \par 02/09/2023 PO#1 and doing well. PT prrescribed and 4 week follow up Consent: The patient's consent was obtained including but not limited to risks of pain, swelling, and crusting. Duration Of Freeze Thaw-Cycle (Seconds): 0 Render Post-Care Instructions In Note?: no Post-Care Instructions: Pt advised to call if not healed with normal skin in 1 month. Detail Level: Detailed

## 2023-03-09 ENCOUNTER — APPOINTMENT (OUTPATIENT)
Dept: ORTHOPEDIC SURGERY | Facility: CLINIC | Age: 38
End: 2023-03-09
Payer: COMMERCIAL

## 2023-03-09 VITALS — WEIGHT: 220 LBS | BODY MASS INDEX: 34.53 KG/M2 | HEIGHT: 67 IN

## 2023-03-09 PROCEDURE — 72170 X-RAY EXAM OF PELVIS: CPT

## 2023-03-09 PROCEDURE — 99024 POSTOP FOLLOW-UP VISIT: CPT

## 2023-03-09 NOTE — ASSESSMENT
[FreeTextEntry1] : 36 y/o F with severe L knee OA\par \par \par Osiel ROSE M.D. discussed the patient’s diagnosis and treatment options, non-surgical and surgical, with the patient and/or legal guardian including the potential benefits and complications of each. Potential complications of non-surgical treatments discussed include residual pain and/or disability, non-healing, progression of symptoms and/or disease. Potential complications of surgery discussed include infection, nerve injury, vascular injury, cartilage injury, ligament injury, tendon injury, muscle injury, skin injury, stiffness, instability, weakness, persistent pain, technical or hardware failure, need for additional surgery, re-injury, medical complication, anesthetic related complication, and/or death. All questions were answered. The patient and/or legal guardian has elected to proceed with surgery. Informed consent obtained for Left CAT TKA\par \par                   \par Preoperative evaluation and testing as needed is advised within 30 days prior to the procedure.\par  \par 02/09/2023 PO#1 and doing well. PT prrescribed and 4 week follow up\par

## 2023-03-09 NOTE — IMAGING
[de-identified] : Constitutional: well developed and well nourished, able to communicate\par Cardiovascular: Peripheral vascular exam is grossly normal\par Neurologic: Alert and oriented, no acute distress.\par Skin: normal skin with no ulcers, rashes, or lesions\par Pulmonary: No respiratory distress, breathing comfortably on room air\par Lymphatics: No obvious lymphadenopathy or lymphedema in areas examined\par \par Knee Exam\par Alignment: Valgus \par Effusion: None\par Atrophy: None                                                \par Stable to Varus/valgus stress\par Posterior Drawer Test: negative\par Anterior Drawer Test: Negative\par Knee Extension/Flexion: 0 / 120\par \par Medial/lateral compartments\par Medial joint line: POS Tenderness\par Lateral joint line: POS Tenderness\par Jennifer test: negative\par \par Patellofemoral joint\par Medial patellar facet: no tenderness\par Patellar grind: Negative\par \par Tendons:\par Pes Anserine: No tenderness\par Gerdy’s Tubercle/ IT Band: No tenderness\par Quadriceps Tendon: No Tenderness\par patellar tendon: no Tenderness\par Tibial tubercle: not tenderness\par Calf: no Tenderness\par \par Neurovascular exam\par Muscle strength: 5/5\par Sensation to light touch: intact\par Distal pulses: 2+\par \par b/l hips - positive impingement\par \par IMAGING:\par 12/22/22: 4v of L knee showing valgus OA, bone on bone arthritis, lateral joint collapse, sclerosis and osteophytes noted\par 03/09/2023 XR pelvis, tonnis grade 3 OA bilateral hips with protrusios left hip

## 2023-03-09 NOTE — HISTORY OF PRESENT ILLNESS
[Throbbing] : throbbing [Intermittent] : intermittent [Household chores] : household chores [Leisure] : leisure [Work] : work [Nothing helps with pain getting better] : Nothing helps with pain getting better [7] : 7 [de-identified] : 12/22/22: 38 y/o F with PMHx of RA. Has known OA to L knee. Has had CSI and gel injections. States the last gel was in June, no relieve but was done with rheumatology. States the first injection had great relieve. States CSI no longer giving her relieve. \par \par PMH: RA (no biologic agents), no hx of blood clots, no bloodthinners\par \par 02/09/2023 post visit left knee pt states she feels better with no pain.\par \par 03/09/2023 post visit left knee pt states she has been having a lot of pain and she cant  do physical therapy  [] : Post Surgical Visit: no [FreeTextEntry1] : left knee [FreeTextEntry5] : pt feels pain and pressure in the knee [FreeTextEntry7] : left hip [de-identified] : motion/pressure

## 2023-03-09 NOTE — PHYSICAL EXAM
[de-identified] : Constitutional: well developed and well nourished, able to communicate\par Cardiovascular: Peripheral vascular exam is grossly normal\par Neurologic: Alert and oriented, no acute distress.\par Skin: normal skin with no ulcers, rashes, or lesions\par Pulmonary: No respiratory distress, breathing comfortably on room air\par Lymphatics: No obvious lymphadenopathy or lymphedema in areas examined\par  \par POSTOP LEFT KNEE EXAM\par Edema: mild\par well healing surgical incision without surrounding erythema/drainage\par \par ROM\par 0-90 degrees of flexion\par Stable to varus/valgus stress\par Stable to Anterior/posterior drawer test\par \par Neurovascular exam\par Motor function 5/5 distal lower extremity\par Sensation to light touch: intact\par Distal pulses: 2+\par \par XR of the L knee today demonstrate TKA in place w/o signs of interval changes from postoperative XR

## 2023-03-20 NOTE — PATIENT PROFILE ADULT - NSPRESCRALCAMT_GEN_A_NUR
1 or 2 Dupixent Pregnancy And Lactation Text: This medication likely crosses the placenta but the risk for the fetus is uncertain. This medication is excreted in breast milk.

## 2023-04-04 NOTE — OCCUPATIONAL THERAPY INITIAL EVALUATION ADULT - MD ORDER
Patient had called and left a voicemail in regards to some extra paperwork he needs ASAP, MARKED AS HIGH PRIORITY.     Form received at [facility] on 4/4/2023.   Due to very high form volumes, the Forms Completion team estimates forms may take longer than our usual 14 business day turnaround goal.  AUTH RECEIVED WITH FORM       EMAIL FROM [place]:    Good Day,    Turns out [facility] needs additional information to complete my return to work authorization for [employer]. As I mentioned on my voicemail, I think I need to submit this to Dr. Gonzales’s office through you. If I can just take it over there, let me know and I’ll do that later today.    While I thought we did this a few weeks ago, I don’t see a copy in the information I’ve kept, nor do they have it. I did a similar form for [employer] for my disability claim.     I’ve filled out a new release of records document for [facility], and have attached it as well.    Please call me if there are any questions. [phone].     JUAN DIEGO Harper    Here’s the text of their note to me. 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  In order to make that determination you will need to obtain the following documents regarding your recent condition(s) and supply them to [employer] [facility].  Any missing documents will delay the review process.  Typically, it takes [facility] 48 hours from receipt of information to conduct the reviews.  It is your responsibility to follow up to make sure [facility] receives any required information.    Please fax the following documents for review to [employer] at [phone] or send a secured email to  [email or web address]      > Treating physician’s notes  > List of all medications  > List of all procedures performed and results  > Diagnosis of condition  > Release to return to work    You are responsible for any costs associated with obtaining the requested documentation. You may need to sign a Release of Information form with your providers. If they need our address on the form it is:     [facility]   [id number]   [place] [facility] [place] [address]     Our office hours are Monday through Friday, 8:00am to 4:30pm CST. Please call [facility] Case Review Team at [phone]. [phone] with any changes or updates to your condition.     OT Consult

## 2023-04-13 ENCOUNTER — APPOINTMENT (OUTPATIENT)
Dept: ORTHOPEDIC SURGERY | Facility: CLINIC | Age: 38
End: 2023-04-13
Payer: COMMERCIAL

## 2023-04-13 VITALS — WEIGHT: 220 LBS | HEIGHT: 67 IN | BODY MASS INDEX: 34.53 KG/M2

## 2023-04-13 DIAGNOSIS — Z96.652 PRESENCE OF LEFT ARTIFICIAL KNEE JOINT: ICD-10-CM

## 2023-04-13 PROCEDURE — 99024 POSTOP FOLLOW-UP VISIT: CPT

## 2023-04-13 NOTE — IMAGING
[de-identified] : Constitutional: well developed and well nourished, able to communicate\par Cardiovascular: Peripheral vascular exam is grossly normal\par Neurologic: Alert and oriented, no acute distress.\par Skin: normal skin with no ulcers, rashes, or lesions\par Pulmonary: No respiratory distress, breathing comfortably on room air\par Lymphatics: No obvious lymphadenopathy or lymphedema in areas examined\par \par Knee Exam\par Alignment: Valgus \par Effusion: None\par Atrophy: None                                                \par Stable to Varus/valgus stress\par Posterior Drawer Test: negative\par Anterior Drawer Test: Negative\par Knee Extension/Flexion: 0 / 120\par \par Medial/lateral compartments\par Medial joint line: POS Tenderness\par Lateral joint line: POS Tenderness\par Jennifer test: negative\par \par Patellofemoral joint\par Medial patellar facet: no tenderness\par Patellar grind: Negative\par \par Tendons:\par Pes Anserine: No tenderness\par Gerdy’s Tubercle/ IT Band: No tenderness\par Quadriceps Tendon: No Tenderness\par patellar tendon: no Tenderness\par Tibial tubercle: not tenderness\par Calf: no Tenderness\par \par Neurovascular exam\par Muscle strength: 5/5\par Sensation to light touch: intact\par Distal pulses: 2+\par \par b/l hips - positive impingement\par \par IMAGING:\par 12/22/22: 4v of L knee showing valgus OA, bone on bone arthritis, lateral joint collapse, sclerosis and osteophytes noted\par 03/09/2023 XR pelvis, tonnis grade 3 OA bilateral hips with protrusios left hip 139

## 2023-04-13 NOTE — HISTORY OF PRESENT ILLNESS
[7] : 7 [Throbbing] : throbbing [Intermittent] : intermittent [Household chores] : household chores [Leisure] : leisure [Work] : work [Nothing helps with pain getting better] : Nothing helps with pain getting better [de-identified] : 12/22/22: 38 y/o F with PMHx of RA. Has known OA to L knee. Has had CSI and gel injections. States the last gel was in June, no relieve but was done with rheumatology. States the first injection had great relieve. States CSI no longer giving her relieve. \par \par PMH: RA (no biologic agents), no hx of blood clots, no bloodthinners\par \par 02/09/2023 post visit left knee pt states she feels better with no pain.\par \par 03/09/2023 post visit left knee pt states she has been having a lot of pain and she cant  do physical therapy \par 04/13/2023 post op visit left knee. 3 months s/p TKA.  No issues with the knee. States hip is now in severe pain. Planning to start biologic medication with rheumatology,. [] : Post Surgical Visit: no [FreeTextEntry1] : left knee [FreeTextEntry5] : pt feels pain and pressure in the knee [FreeTextEntry7] : left hip [de-identified] : motion/pressure

## 2023-04-13 NOTE — PHYSICAL EXAM
[de-identified] : Constitutional: well developed and well nourished, able to communicate\par Cardiovascular: Peripheral vascular exam is grossly normal\par Neurologic: Alert and oriented, no acute distress.\par Skin: normal skin with no ulcers, rashes, or lesions\par Pulmonary: No respiratory distress, breathing comfortably on room air\par Lymphatics: No obvious lymphadenopathy or lymphedema in areas examined\par  \par POSTOP LEFT KNEE EXAM\par Edema: mild\par well healing surgical incision without surrounding erythema/drainage\par \par ROM\par 0-120 degrees of flexion\par Stable to varus/valgus stress\par Stable to Anterior/posterior drawer test\par \par Neurovascular exam\par Motor function 5/5 distal lower extremity\par Sensation to light touch: intact\par Distal pulses: 2+\par \par XR of the L knee today demonstrate TKA in place w/o signs of interval changes from postoperative XR

## 2023-04-13 NOTE — ASSESSMENT
[FreeTextEntry1] : 36 y/o F with severe L knee OA\par \par 02/09/2023 PO#1 and doing well. PT prrescribed and 4 week follow up\par \par 4/13/23: 3 months s/p TKA Doing well from knee standpoint. One year follow up. Reminded about dental abx ppx.\par \par  L hip now with severe pain, would like to undergo CSI. WIll schedule with Dr. Augustine team.\par

## 2023-04-20 ENCOUNTER — APPOINTMENT (OUTPATIENT)
Age: 38
End: 2023-04-20

## 2023-07-24 ENCOUNTER — APPOINTMENT (OUTPATIENT)
Dept: PAIN MANAGEMENT | Facility: CLINIC | Age: 38
End: 2023-07-24
Payer: COMMERCIAL

## 2023-07-24 PROCEDURE — 81025 URINE PREGNANCY TEST: CPT

## 2023-07-24 PROCEDURE — 27093 INJECTION FOR HIP X-RAY: CPT | Mod: LT

## 2023-07-24 PROCEDURE — 73525 CONTRAST X-RAY OF HIP: CPT | Mod: 26,59

## 2023-07-24 NOTE — PROCEDURE
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  The risks and benefits of the procedure were discussed extensively with the patient.  The consent of the patient was obtained and the following procedure was performed.\par \par The patient was placed in the supine position with left hip flexed and externally rotated 25 degrees.  The area of the left groin was prepped and draped in a sterile fashion.  The fluoroscopic image intensifier was then positioned so that the left hip appeared in view, and the midline intertrochanteric region was identified and marked. The skin and subcutaneous structures were then anesthetized using 1 cc of 1% lidocaine.  A 22 gauge spinal needle was then inserted and directed into this left hip intra-capsular region.  After negative aspiration for heme,  3 cc of Omnipaque was injected and appeared to fill the joint  margins.\par \par Left hip arthrogram showed no intravascular flow, and good spread around the femoral head and to the acetabulum. An injectate of 3cc 0.25% Marcaine plus 80 mg of Kenalog Medrol was then injected into the left hip space.\par \par The needle was then removed and pressure was applied.The patient was instructed to apply ice over the injection site for twenty minutes every two hours for the next 24 to 48 hours.  The patient was also instructed to contact me immediately if there were any problems.\par \par Jane Augustine M.D.

## 2023-12-08 ENCOUNTER — APPOINTMENT (OUTPATIENT)
Dept: ORTHOPEDIC SURGERY | Facility: CLINIC | Age: 38
End: 2023-12-08
Payer: COMMERCIAL

## 2023-12-08 VITALS — WEIGHT: 220 LBS | BODY MASS INDEX: 34.53 KG/M2 | HEIGHT: 67 IN

## 2023-12-08 PROCEDURE — 99214 OFFICE O/P EST MOD 30 MIN: CPT

## 2023-12-19 ENCOUNTER — APPOINTMENT (OUTPATIENT)
Dept: PAIN MANAGEMENT | Facility: CLINIC | Age: 38
End: 2023-12-19
Payer: COMMERCIAL

## 2023-12-19 DIAGNOSIS — M16.12 UNILATERAL PRIMARY OSTEOARTHRITIS, LEFT HIP: ICD-10-CM

## 2023-12-19 PROCEDURE — J3490M: CUSTOM

## 2023-12-19 PROCEDURE — 20610 DRAIN/INJ JOINT/BURSA W/O US: CPT | Mod: LT

## 2023-12-19 NOTE — PROCEDURE
[FreeTextEntry3] : Date of Service: 12/19/2023   Account: [id number]  Patient: DANAY GALLARDO   YOB: 1985  Age: 38 year   Surgeon: Jane Augustine M.D.  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  The risks and benefits of the procedure were discussed extensively with the patient.  The consent of the patient was obtained and the following procedure was performed.  The patient was placed in the supine position with left hip flexed and externally rotated 25 degrees.  The area of the left groin was prepped and draped in a sterile fashion.  The fluoroscopic image intensifier was then positioned so that the left hip appeared in view, and the midline intertrochanteric region was identified and marked. The skin and subcutaneous structures were then anesthetized using 1 cc of 1% lidocaine.  A 22 gauge spinal needle was then inserted and directed into this left hip intra-capsular region.  After negative aspiration for heme,  3 cc of Omnipaque was injected and appeared to fill the joint  margins.  Left hip arthrogram showed no intravascular flow, and good spread around the femoral head and to the acetabulum. An injectate of 3cc 0.25% Marcaine plus 80 mg of Kenalog Medrol was then injected into the left hip space.  The needle was then removed and pressure was applied.  The patient was instructed to apply ice over the injection site for twenty minutes every two hours for the next 24 to 48 hours.  The patient was also instructed to contact me immediately if there were any problems.  Jane Augustine M.D.

## 2024-03-07 ENCOUNTER — APPOINTMENT (OUTPATIENT)
Dept: ORTHOPEDIC SURGERY | Facility: CLINIC | Age: 39
End: 2024-03-07

## 2024-03-26 PROBLEM — M32.9 SYSTEMIC LUPUS ERYTHEMATOSUS, UNSPECIFIED: Chronic | Status: ACTIVE | Noted: 2019-09-14

## 2024-03-26 PROBLEM — M06.9 RHEUMATOID ARTHRITIS, UNSPECIFIED: Chronic | Status: ACTIVE | Noted: 2019-09-15

## 2024-03-26 PROBLEM — D64.9 ANEMIA, UNSPECIFIED: Chronic | Status: ACTIVE | Noted: 2019-09-15

## 2025-06-15 NOTE — DISCHARGE NOTE PROVIDER - NSDCQMSTROKE_NEU_ALL_CORE
"Patient Education     Swelling   The Basics   Written by the doctors and editors at [facility]   What is swelling? -- Swelling happens when fluid collects in small spaces around tissues and organs inside the body. Another word for swelling is \"edema.\" Some common parts of the body where people can have swelling are the:   Lower legs or hands   Belly   Chest - Swelling can occur in the lungs or in the space around the lungs.  Swelling in the legs, hands, and belly can be uncomfortable and can be a symptom of a more serious condition. Swelling in the lungs can be life-threatening, because it is usually a symptom of a serious heart problem.  What are the symptoms of swelling? -- Symptoms of swelling can include:   Puffiness of the skin, which can cause the skin to look stretched and shiny - This often occurs with swelling in the lower legs or lower back, and can be worse after people sit or stand for a long time (figure 1).   Increase in belly size (with swelling of the belly)   Trouble breathing (with swelling in the chest)  What are the causes of swelling? -- Different conditions can cause swelling. Some of these include:   Problems with veins (blood vessels) in the legs - Normally, veins carry blood from the body back to the heart. But if valves in the veins do not work well, the veins cannot pump enough blood back to the heart. This can cause swelling in the lower legs.   Blood clots - People who have a blood clot blocking a leg vein can have swelling in the feet or ankles.   Pregnancy - Pregnant people can have swelling in the hands, feet, or face.   Monthly periods - People can have swelling in different parts of their body before they get their period.   Medicines - Swelling can be a side effect of some medicines, such as medicines for diabetes, high blood pressure, or pain.   Kidney problems - People who have certain kidney problems can have swelling in the lower legs or around the eyes.   Heart failure - Heart " "failure is a type of heart problem in which the heart cannot pump normally. People with heart failure can have swelling in the legs, belly, or lungs.   Liver problems - People who have certain liver problems can have swelling in the belly or lower legs.   Travel - People who sit for a long time when traveling can have swelling in the lower legs.  When should I call my doctor or nurse? -- Call your doctor or nurse if you have new swelling:   In 1 or both of your legs   In your hands   In your belly   Around your eyes  You should also call your doctor or nurse if you travel and sit for a long time, and then have leg pain or swelling that does not go away after a few days.  How is swelling treated? -- Doctors can treat swelling in different ways, depending on the cause. Treatment can include 1 or more of the following:   Treatment for the medical condition that is causing the swelling   Diet changes to reduce the amount of salt in the food that you eat   Medicines to help your body get rid of extra fluid   Special socks called \"compression stockings\" - These fit tightly over the ankle and leg, and can reduce leg swelling. If your doctor or nurse recommends that you wear them, they will tell you which type to wear and how to put them on (figure 2 and figure 3 and table 1).   Raising the legs up - Some people can reduce swelling in the legs, ankles, and feet by raising their legs up 3 or 4 times a day for 30 minutes each time. The legs need to be raised above the level of the heart.  Not all types of swelling need treatment. For example, swelling that occurs during pregnancy or before monthly periods usually does not need treatment.  How can I help prevent leg swelling when I travel on long flights? -- To help prevent leg swelling on flights that are longer than 6 to 8 hours:   Stand up and walk around every hour or 2.   Do not smoke before traveling.   Wear loose-fitting and comfortable clothes.   Ask if you can sit in " "the bulkhead or emergency exit row.   Point and flex your feet, and bend your knees from time to time.   Drink plenty of fluids, and do not drink alcohol.   Do not take medicines such as sleeping pills that can prevent you from getting up and moving around.  All topics are updated as new evidence becomes available and our peer review process is complete.  This topic retrieved from "[employer] on: Mar 13, 2024.  Topic 85058 Version 11.0  Release: 32.2.4 - C32.71  © 2024 [employer] and/or its affiliates. All rights reserved.  figure 1: Pitting edema     Graphic 85705 Version 12.0  figure 2: Putting on compression stockings     Graphic 58107 Version 3.0  figure 3: Heel-pocket-out method to put on compression stockings     The heel-pocket-out method to put on compression stockings is as follows:  (A) Turn the leg part of the stocking inside-out down to the heel.  (B) Put your foot into the stocking, hold onto the folded edge, and pull the stocking onto your foot and over the heel.  (C) Gently work the stocking up your leg by turning it right-side out.  Graphic 33186 Version 8.0  table 1: Tips for using compression stockings  Tips for using compression stockings:   Wash new compression stockings before wearing them to reduce their stiffness and to make them easier to put on.   Put on stockings as early as possible in the morning after you bandage any sores because swelling is less in the morning. If you do not put the stockings on early, raise your legs for 20 to 30 minutes before putting the stockings on.   When putting on stockings, sit on a chair with firm back support (not on the bed).   Knee-high stockings can be put on using the \"heel-pocket-out method\":   Turn the leg part of the stocking inside-out down to the heel.   Put your foot into the stocking, hold onto the folded edge, and pull the stocking onto your foot and over the heel.   Gently work the stocking up your leg by turning it right-side out.   Some " people find it helpful to wear rubber gloves to put their stockings on. This can make it easier to slide the stockings up the legs.   Heavy compression stockings may go on more easily if you wear light silk pantyhose under the compression stockings, or if you first put powder on your legs.   Skin moisturizers and treatments that are used to treat open sores can make the stockings dirty and wear them out. Wash the stockings each day after wearing them, if possible. Wash stockings in cold water by hand. You can also wash them with cold water and a small amount of mild detergent in a washing machine. Hang the stockings up to dry, and do not dry them in a machine. Buying at least 2 pairs of stockings at a time will let you wear 1 pair while the other pair dries.   If you have an allergy to rubber (latex), buy compression stockings without elastic.   If you are not able to pull on your stockings, talk with your doctor or nurse. There are different stockings you can use or devices that can help you put on stockings.   Graphic 41392 Version 6.0  Consumer Information Use and Disclaimer   Disclaimer: This generalized information is a limited summary of diagnosis, treatment, and/or medication information. It is not meant to be comprehensive and should be used as a tool to help the user understand and/or assess potential diagnostic and treatment options. It does NOT include all information about conditions, treatments, medications, side effects, or risks that may apply to a specific patient. It is not intended to be medical advice or a substitute for the medical advice, diagnosis, or treatment of a health care provider based on the health care provider's examination and assessment of a patient's specific and unique circumstances. Patients must speak with a health care provider for complete information about their health, medical questions, and treatment options, including any risks or benefits regarding use of medications.  This information does not endorse any treatments or medications as safe, effective, or approved for treating a specific patient. [employer] and its affiliates disclaim any warranty or liability relating to this information or the use thereof.The use of this information is governed by the Terms of Use, available at https://www.[employer].com/en/know/clinical-effectiveness-terms. 2024© [employer] and its affiliates and/or licensors. All rights reserved.  Copyright   © 2024 [employer] and/or its affiliates. All rights reserved.     No

## (undated) DEVICE — DRSG AQUACEL 3.5 X 12"

## (undated) DEVICE — SYR LUER LOK 50CC

## (undated) DEVICE — SUT STRATAFIX SYMMETRIC PDS PLUS 1 18" CTX VIOLET

## (undated) DEVICE — MEDICATION LABELS W MARKER

## (undated) DEVICE — FRA-ESU BOVIE FORCE TRIAD T7J19731DX: Type: DURABLE MEDICAL EQUIPMENT

## (undated) DEVICE — PREP CHLORAPREP HI-LITE ORANGE 26ML

## (undated) DEVICE — DRSG ACE BANDAGE 6"

## (undated) DEVICE — SUT STRATAFIX SPIRAL MONOCRYL PLUS 4-0 45CM PS-2 UNDYED

## (undated) DEVICE — NDL SPINAL 22G X 3.5" (BLACK)

## (undated) DEVICE — PACK TOTAL KNEE

## (undated) DEVICE — DRSG TELFA 3 X 8

## (undated) DEVICE — NDL HYPO SAFE 22G X 1.5" (BLACK)

## (undated) DEVICE — MAKO CHECKPOINT KIT FEMORAL / TIBIAL

## (undated) DEVICE — DRSG DERMABOND PRINEO 22CM

## (undated) DEVICE — FRA-TOURNIQUET 402010060005: Type: DURABLE MEDICAL EQUIPMENT

## (undated) DEVICE — MAKO STRYKER SILICONE RETRACTOR CORD

## (undated) DEVICE — GLV 7 PROTEXIS (WHITE)

## (undated) DEVICE — NDL HYPO SAFE 18G X 1.5" (PINK)

## (undated) DEVICE — STRYKER MIXEVAC 3 BONE CEMENT MIXER

## (undated) DEVICE — SUT VICRYL 2-0 27" CT-1 UNDYED

## (undated) DEVICE — POSITIONER LEG WRAP STERILE

## (undated) DEVICE — SAW BLADE STRYKER SAGITTAL EXTRA WIDE THIN SHORT

## (undated) DEVICE — BLADE SURGICAL #11 CARBON

## (undated) DEVICE — MAKO BLADE STANDARD

## (undated) DEVICE — SOL BETADINE POUCH 0.75OZ STERILE

## (undated) DEVICE — MAKO VIZADISC KNEE TRACKING KIT

## (undated) DEVICE — GLV 7 PROTEXIS ORTHO (BROWN)

## (undated) DEVICE — SUT PDO 2 1/2 CIRCLE 40MM NDL 45CM

## (undated) DEVICE — DRAPE 3/4 SHEET W REINFORCEMENT 56X77"

## (undated) DEVICE — MAKO DRAPE KIT

## (undated) DEVICE — DRAPE INSTRUMENT POUCH 6.75" X 11"

## (undated) DEVICE — GLV 7 PROTEXIS (BLUE)